# Patient Record
Sex: FEMALE | Race: WHITE | URBAN - METROPOLITAN AREA
[De-identification: names, ages, dates, MRNs, and addresses within clinical notes are randomized per-mention and may not be internally consistent; named-entity substitution may affect disease eponyms.]

---

## 2020-09-01 ENCOUNTER — OFFICE VISIT (OUTPATIENT)
Dept: FAMILY MEDICINE CLINIC | Age: 60
End: 2020-09-01
Payer: COMMERCIAL

## 2020-09-01 VITALS
HEART RATE: 84 BPM | OXYGEN SATURATION: 95 % | HEIGHT: 66 IN | BODY MASS INDEX: 22.18 KG/M2 | WEIGHT: 138 LBS | TEMPERATURE: 98.9 F

## 2020-09-01 PROCEDURE — 99202 OFFICE O/P NEW SF 15 MIN: CPT | Performed by: NURSE PRACTITIONER

## 2020-09-01 RX ORDER — ZOLPIDEM TARTRATE 5 MG/1
5 TABLET ORAL
COMMUNITY
End: 2022-06-13 | Stop reason: SDUPTHER

## 2020-09-01 RX ORDER — INSULIN LISPRO 100 [IU]/ML
INJECTION, SOLUTION INTRAVENOUS; SUBCUTANEOUS
COMMUNITY
End: 2022-11-02

## 2020-09-01 ASSESSMENT — PATIENT HEALTH QUESTIONNAIRE - PHQ9
SUM OF ALL RESPONSES TO PHQ9 QUESTIONS 1 & 2: 0
1. LITTLE INTEREST OR PLEASURE IN DOING THINGS: 0
SUM OF ALL RESPONSES TO PHQ QUESTIONS 1-9: 0
2. FEELING DOWN, DEPRESSED OR HOPELESS: 0
SUM OF ALL RESPONSES TO PHQ QUESTIONS 1-9: 0

## 2020-09-01 ASSESSMENT — ENCOUNTER SYMPTOMS
RHINORRHEA: 1
ABDOMINAL PAIN: 0
SINUS PRESSURE: 1
COUGH: 0
DIARRHEA: 0
VOMITING: 0
SORE THROAT: 0
SINUS PAIN: 1
SHORTNESS OF BREATH: 0
NAUSEA: 0

## 2020-09-01 NOTE — LETTER
[address] [place] [address]  [place] [address]  Phone: [phone]  Fax: [phone]    TYLER Ramachandran CNP        September 1, 2020     Patient: Tayla Merida   YOB: 1960   Date of Visit: 9/1/2020       To Whom It May Concern: It is my medical opinion that Tayla Merida is excused pending COVID results. If you have any questions or concerns, please don't hesitate to call.     Sincerely,        TYLER Ramachandran CNP

## 2020-09-01 NOTE — PATIENT INSTRUCTIONS
Advance Care Planning  People with COVID-19 may have no symptoms, mild symptoms, such as fever, cough, and shortness of breath or they may have more severe illness, developing severe and fatal pneumonia. As a result, Advance Care Planning with attention to naming a health care decision maker (someone you trust to make healthcare decisions for you if you could not speak for yourself) and sharing other health care preferences is important BEFORE a possible health crisis. Please contact your Primary Care Provider to discuss Advance Care Planning. Preventing the Spread of Coronavirus Disease 2019 in Homes and Residential Communities  For the most recent information go to [email or web address]    Prevention steps for People with confirmed or suspected COVID-19 (including persons under investigation) who do not need to be hospitalized  and   People with confirmed COVID-19 who were hospitalized and determined to be medically stable to go home    Your healthcare provider and public health staff will evaluate whether you can be cared for at home. If it is determined that you do not need to be hospitalized and can be isolated at home, you will be monitored by staff from your local or state health department. You should follow the prevention steps below until a healthcare provider or local or state health department says you can return to your normal activities. Stay home except to get medical care  People who are mildly ill with COVID-19 are able to isolate at home during their illness. You should restrict activities outside your home, except for getting medical care. Do not go to work, school, or public areas. Avoid using public transportation, ride-sharing, or taxis. Separate yourself from other people and animals in your home  People: As much as possible, you should stay in a specific room and away from other people in your home.  Also, you should use a separate before eating or preparing food. If soap and water are not readily available, use an alcohol-based hand  with at least 60% alcohol, covering all surfaces of your hands and rubbing them together until they feel dry. Soap and water are the best option if hands are visibly dirty. Avoid touching your eyes, nose, and mouth with unwashed hands. Avoid sharing personal household items  You should not share dishes, drinking glasses, cups, eating utensils, towels, or bedding with other people or pets in your home. After using these items, they should be washed thoroughly with soap and water. Clean all high-touch surfaces everyday  High touch surfaces include counters, tabletops, doorknobs, bathroom fixtures, toilets, phones, keyboards, tablets, and bedside tables. Also, clean any surfaces that may have blood, stool, or body fluids on them. Use a household cleaning spray or wipe, according to the label instructions. Labels contain instructions for safe and effective use of the cleaning product including precautions you should take when applying the product, such as wearing gloves and making sure you have good ventilation during use of the product. Monitor your symptoms  Seek prompt medical attention if your illness is worsening (e.g., difficulty breathing). Before seeking care, call your healthcare provider and tell them that you have, or are being evaluated for, COVID-19. Put on a facemask before you enter the facility. These steps will help the healthcare providers office to keep other people in the office or waiting room from getting infected or exposed. Ask your healthcare provider to call the local or state health department. Persons who are placed under active monitoring or facilitated self-monitoring should follow instructions provided by their local health department or occupational health professionals, as appropriate. When working with your local health department check their available hours.   If you have a medical emergency and need to call 911, notify the dispatch personnel that you have, or are being evaluated for COVID-19. If possible, put on a facemask before emergency medical services arrive. Discontinuing home isolation  Patients with confirmed COVID-19 should remain under home isolation precautions until the risk of secondary transmission to others is thought to be low. The decision to discontinue home isolation precautions should be made on a case-by-case basis, in consultation with healthcare providers and state and local health departments.

## 2020-09-02 ENCOUNTER — HOSPITAL ENCOUNTER (OUTPATIENT)
Age: 60
Setting detail: SPECIMEN
Discharge: HOME | End: 2020-09-02
Payer: COMMERCIAL

## 2020-09-02 NOTE — PROGRESS NOTES
throat. Loss of taste and smell   Respiratory: Negative for cough and shortness of breath. Cardiovascular: Negative for chest pain and palpitations. Gastrointestinal: Negative for abdominal pain, diarrhea, nausea and vomiting. Musculoskeletal: Positive for myalgias. Neurological: Positive for headaches. Negative for dizziness. All other systems reviewed and are negative. Objective:     Physical Exam  Vitals signs and nursing note reviewed. Constitutional:       General: She is not in acute distress. Appearance: Normal appearance. She is not toxic-appearing. HENT:      Nose: Congestion and rhinorrhea present. Right Sinus: Maxillary sinus tenderness and frontal sinus tenderness present. Left Sinus: Maxillary sinus tenderness and frontal sinus tenderness present. Mouth/Throat:      Mouth: Mucous membranes are moist.   Cardiovascular:      Rate and Rhythm: Normal rate. Pulmonary:      Effort: Pulmonary effort is normal. No respiratory distress. Breath sounds: Normal breath sounds. Neurological:      Mental Status: She is alert. Pulse 84   Temp 98.9 °F (37.2 °C) (Tympanic)   Ht 5' 6\" (1.676 m)   Wt 138 lb (62.6 kg)   SpO2 95%   BMI 22.27 kg/m²   Lab Review   No visits with results within 6 Month(s) from this visit. Latest known visit with results is:   No results found for any previous visit. Assessment:       Diagnosis Orders   1. Loss of taste  COVID-19 Ambulatory   2. Loss of smell  COVID-19 Ambulatory   3. Acute sinusitis, recurrence not specified, unspecified location  COVID-19 Ambulatory       Plan:      Return if symptoms worsen or fail to improve. No orders of the defined types were placed in this encounter. Continue sinusitis treatment. Recommend isolation pending covid results. Discussed treatment regimen to include rest, hydration, tylenol prn. Discussed deep breathing exercises.   Discussed to monitor for progression of symptoms. Follow up as needed. Will contact patient for results       Patient given educational materials - see patient instructions. Discussed use, benefit, and side effects of prescribed medications. All patientquestions answered. Pt voiced understanding. This note was transcribed using dictation with Dragon services. Efforts were made to correct any errors but some words may be misinterpreted.      Electronically signed by TYLER Lopez CNP on 9/1/2020at 9:46 PM

## 2020-09-03 LAB — SARS-COV-2, NAA: NOT DETECTED

## 2020-09-04 ENCOUNTER — TELEPHONE (OUTPATIENT)
Dept: PRIMARY CARE CLINIC | Age: 60
End: 2020-09-04

## 2021-08-31 ENCOUNTER — OFFICE VISIT (OUTPATIENT)
Dept: PRIMARY CARE CLINIC | Age: 61
End: 2021-08-31
Payer: COMMERCIAL

## 2021-08-31 VITALS
SYSTOLIC BLOOD PRESSURE: 114 MMHG | HEART RATE: 95 BPM | TEMPERATURE: 97.2 F | HEIGHT: 65 IN | OXYGEN SATURATION: 96 % | WEIGHT: 150 LBS | DIASTOLIC BLOOD PRESSURE: 76 MMHG | BODY MASS INDEX: 24.99 KG/M2

## 2021-08-31 DIAGNOSIS — H10.32 ACUTE CONJUNCTIVITIS OF LEFT EYE, UNSPECIFIED ACUTE CONJUNCTIVITIS TYPE: Primary | ICD-10-CM

## 2021-08-31 PROCEDURE — 99213 OFFICE O/P EST LOW 20 MIN: CPT | Performed by: NURSE PRACTITIONER

## 2021-08-31 PROCEDURE — G8420 CALC BMI NORM PARAMETERS: HCPCS | Performed by: NURSE PRACTITIONER

## 2021-08-31 PROCEDURE — 1036F TOBACCO NON-USER: CPT | Performed by: NURSE PRACTITIONER

## 2021-08-31 PROCEDURE — G8427 DOCREV CUR MEDS BY ELIG CLIN: HCPCS | Performed by: NURSE PRACTITIONER

## 2021-08-31 PROCEDURE — 3017F COLORECTAL CA SCREEN DOC REV: CPT | Performed by: NURSE PRACTITIONER

## 2021-08-31 RX ORDER — SULFACETAMIDE SODIUM 100 MG/ML
SOLUTION/ DROPS OPHTHALMIC
COMMUNITY
Start: 2021-08-19 | End: 2021-08-31 | Stop reason: ALTCHOICE

## 2021-08-31 RX ORDER — ATORVASTATIN CALCIUM 20 MG/1
TABLET, FILM COATED ORAL
COMMUNITY
Start: 2021-08-23 | End: 2022-07-14

## 2021-08-31 RX ORDER — POLYMYXIN B SULFATE AND TRIMETHOPRIM 1; 10000 MG/ML; [USP'U]/ML
1 SOLUTION OPHTHALMIC EVERY 4 HOURS
Qty: 10 ML | Refills: 0 | Status: SHIPPED | OUTPATIENT
Start: 2021-08-31 | End: 2021-09-10

## 2021-08-31 ASSESSMENT — ENCOUNTER SYMPTOMS
COUGH: 0
DOUBLE VISION: 0
EYE DISCHARGE: 1
PHOTOPHOBIA: 1
WHEEZING: 0
EYE PAIN: 1
BLURRED VISION: 0
CHEST TIGHTNESS: 0
EYE REDNESS: 1
RESPIRATORY NEGATIVE: 1
EYE ITCHING: 1
FOREIGN BODY SENSATION: 0
SHORTNESS OF BREATH: 0

## 2021-08-31 ASSESSMENT — PATIENT HEALTH QUESTIONNAIRE - PHQ9
1. LITTLE INTEREST OR PLEASURE IN DOING THINGS: 0
SUM OF ALL RESPONSES TO PHQ QUESTIONS 1-9: 0
SUM OF ALL RESPONSES TO PHQ9 QUESTIONS 1 & 2: 0
SUM OF ALL RESPONSES TO PHQ QUESTIONS 1-9: 0
2. FEELING DOWN, DEPRESSED OR HOPELESS: 0
SUM OF ALL RESPONSES TO PHQ QUESTIONS 1-9: 0

## 2021-08-31 NOTE — PROGRESS NOTES
[facility] [place] [facility]  [address] [address]  Dept: [phone]  Dept Fax: [phone]     Kristen Vaughn is a 63 y.o. female who presents to the urgent care today for her medicalconditions/complaints as noted below. Kristen Vaughn is c/o of Eye Drainage (eye redness, drainage and painful x 4 days; has been using eye drops prescribed by PCP for 2 days)    HPI:      Eye Pain   The left eye is affected. This is a new problem. Episode onset: 4 days ago. The problem has been unchanged. Injury mechanism: got shampoo in her left eye. The pain is mild. There is no known exposure to pink eye. She does not wear contacts. Associated symptoms include an eye discharge (watery), eye redness, itching (mild), photophobia and a recent URI (PND). Pertinent negatives include no blurred vision, double vision, fever or foreign body sensation. Treatments tried: bleph 10. The treatment provided no relief. Past Medical History:   Diagnosis Date    Type 2 diabetes mellitus without complication (HCC)       Current Outpatient Medications   Medication Sig Dispense Refill    atorvastatin (LIPITOR) 20 MG tablet       metFORMIN (GLUCOPHAGE) 500 MG tablet       Insulin Degludec (TRESIBA FLEXTOUCH SC) Inject into the skin      trimethoprim-polymyxin b (POLYTRIM) 13173-8.1 UNIT/ML-% ophthalmic solution Place 1 drop into the left eye every 4 hours for 10 days 10 mL 0    zolpidem (AMBIEN) 5 MG tablet Take 5 mg by mouth.  canagliflozin (INVOKANA) 300 MG TABS tablet Take 300 mg by mouth      GLIMEPIRIDE PO Take by mouth 2 times daily      SITagliptin (JANUVIA) 100 MG tablet Take 100 mg by mouth 2 times daily (Patient not taking: Reported on 8/31/2021)      insulin lispro (HUMALOG) 100 UNIT/ML injection cartridge Inject into the skin (Patient not taking: Reported on 8/31/2021)       No current facility-administered medications for this visit.      Allergies   Allergen Reactions    Fenofibrate Micronized     Nitrofurantoin     Amoxicillin Rash    Sulfa Antibiotics Rash     Reviewed PMH, SH, and FH with the patient and updated. Subjective:      Review of Systems   Constitutional: Negative for chills, fatigue and fever. HENT: Positive for postnasal drip. Eyes: Positive for photophobia, pain, discharge (watery), redness and itching (mild). Negative for blurred vision, double vision and visual disturbance. Respiratory: Negative. Negative for cough, chest tightness, shortness of breath and wheezing. Cardiovascular: Negative. Negative for chest pain. Skin: Negative for rash. All other systems reviewed and are negative. Objective:      Physical Exam  Vitals and nursing note reviewed. Constitutional:       General: She is not in acute distress. Appearance: She is well-developed. She is not diaphoretic. HENT:      Head: Normocephalic and atraumatic. Eyes:      General: Lids are normal. Lids are everted, no foreign bodies appreciated. Right eye: No discharge. Left eye: No foreign body, discharge or hordeolum. Extraocular Movements: Extraocular movements intact. Left eye: Normal extraocular motion. Conjunctiva/sclera:      Left eye: Left conjunctiva is injected. Pupils: Pupils are equal, round, and reactive to light. Left eye: No corneal abrasion. Cardiovascular:      Rate and Rhythm: Normal rate and regular rhythm. Heart sounds: Normal heart sounds. No murmur heard. Pulmonary:      Effort: Pulmonary effort is normal. No respiratory distress. Breath sounds: Normal breath sounds. No wheezing. Skin:     General: Skin is warm and dry. Neurological:      Mental Status: She is alert.        /76 (Site: Left Upper Arm, Position: Sitting, Cuff Size: Medium Adult)   Pulse 95   Temp 97.2 °F (36.2 °C) (Infrared)   Ht 5' 5\" (1.651 m)   Wt 150 lb (68 kg)   SpO2 96%   BMI 24.96 kg/m²     Assessment: Diagnosis Orders   1. Acute conjunctivitis of left eye, unspecified acute conjunctivitis type  trimethoprim-polymyxin b (POLYTRIM) 86855-7.1 UNIT/ML-% ophthalmic solution     Plan:      Based on the clinical exam findings-- I will treat this as conjunctivitis at this time with antibiotic eye gtts. Cool compresses to the eyes if desired. Good hand hygiene encouraged. Patient agreeable to treatment plan. Educational materials provided on AVS.  Follow up eye doctor if symptoms do not improve/worsen. Orders Placed This Encounter   Medications    trimethoprim-polymyxin b (POLYTRIM) 17162-5.1 UNIT/ML-% ophthalmic solution     Sig: Place 1 drop into the left eye every 4 hours for 10 days     Dispense:  10 mL     Refill:  0        Patient given educational materials - see patient instructions. Discussed use, benefit, and side effects of prescribed medications. All patientquestions answered. Pt voiced understanding.     Electronically signed by TYLER Adames CNP on 8/31/2021at 7:34 PM

## 2021-12-17 ENCOUNTER — OFFICE VISIT (OUTPATIENT)
Dept: PRIMARY CARE CLINIC | Age: 61
End: 2021-12-17
Payer: COMMERCIAL

## 2021-12-17 ENCOUNTER — HOSPITAL ENCOUNTER (OUTPATIENT)
Age: 61
Setting detail: SPECIMEN
Discharge: HOME | End: 2021-12-17

## 2021-12-17 VITALS
TEMPERATURE: 97 F | WEIGHT: 140 LBS | OXYGEN SATURATION: 99 % | HEART RATE: 96 BPM | SYSTOLIC BLOOD PRESSURE: 121 MMHG | DIASTOLIC BLOOD PRESSURE: 72 MMHG | HEIGHT: 66 IN | BODY MASS INDEX: 22.5 KG/M2

## 2021-12-17 DIAGNOSIS — R39.9 UTI SYMPTOMS: Primary | ICD-10-CM

## 2021-12-17 DIAGNOSIS — R39.9 UTI SYMPTOMS: ICD-10-CM

## 2021-12-17 DIAGNOSIS — N30.01 ACUTE CYSTITIS WITH HEMATURIA: ICD-10-CM

## 2021-12-17 LAB
BILIRUBIN, POC: ABNORMAL
BLOOD URINE, POC: ABNORMAL
CLARITY, POC: CLEAR
COLOR, POC: ABNORMAL
GLUCOSE URINE, POC: ABNORMAL
KETONES, POC: ABNORMAL
LEUKOCYTE EST, POC: ABNORMAL
NITRITE, POC: POSITIVE
PH, POC: 5.5
PROTEIN, POC: ABNORMAL
SPECIFIC GRAVITY, POC: 1.01
UROBILINOGEN, POC: 0.2

## 2021-12-17 PROCEDURE — 99214 OFFICE O/P EST MOD 30 MIN: CPT | Performed by: FAMILY MEDICINE

## 2021-12-17 PROCEDURE — G8484 FLU IMMUNIZE NO ADMIN: HCPCS | Performed by: FAMILY MEDICINE

## 2021-12-17 PROCEDURE — G8420 CALC BMI NORM PARAMETERS: HCPCS | Performed by: FAMILY MEDICINE

## 2021-12-17 PROCEDURE — 81003 URINALYSIS AUTO W/O SCOPE: CPT | Performed by: FAMILY MEDICINE

## 2021-12-17 PROCEDURE — 1036F TOBACCO NON-USER: CPT | Performed by: FAMILY MEDICINE

## 2021-12-17 PROCEDURE — G8427 DOCREV CUR MEDS BY ELIG CLIN: HCPCS | Performed by: FAMILY MEDICINE

## 2021-12-17 PROCEDURE — 3017F COLORECTAL CA SCREEN DOC REV: CPT | Performed by: FAMILY MEDICINE

## 2021-12-17 RX ORDER — LEVOFLOXACIN 250 MG/1
250 TABLET ORAL DAILY
Qty: 5 TABLET | Refills: 0 | Status: SHIPPED | OUTPATIENT
Start: 2021-12-17 | End: 2021-12-22

## 2021-12-17 SDOH — ECONOMIC STABILITY: FOOD INSECURITY: WITHIN THE PAST 12 MONTHS, THE FOOD YOU BOUGHT JUST DIDN'T LAST AND YOU DIDN'T HAVE MONEY TO GET MORE.: PATIENT DECLINED

## 2021-12-17 SDOH — ECONOMIC STABILITY: FOOD INSECURITY: WITHIN THE PAST 12 MONTHS, YOU WORRIED THAT YOUR FOOD WOULD RUN OUT BEFORE YOU GOT MONEY TO BUY MORE.: PATIENT DECLINED

## 2021-12-17 ASSESSMENT — ENCOUNTER SYMPTOMS
NAUSEA: 1
BACK PAIN: 1
VOMITING: 0
DIARRHEA: 0
RESPIRATORY NEGATIVE: 1
ABDOMINAL PAIN: 0

## 2021-12-17 ASSESSMENT — SOCIAL DETERMINANTS OF HEALTH (SDOH): HOW HARD IS IT FOR YOU TO PAY FOR THE VERY BASICS LIKE FOOD, HOUSING, MEDICAL CARE, AND HEATING?: PATIENT DECLINED

## 2021-12-17 NOTE — PROGRESS NOTES
[facility] [place] [facility]  [address] [address]  Dept: [phone]  Dept Fax: [phone]    Chhaya Cruz is a 63 y.o. female who presents today for her medical conditions/complaintsas noted below. Chhaya Cruz is c/o of Urinary Frequency (Been going on for a month ), Lower Back Pain, and Fatigue        HPI:     Urinary Frequency   This is a new problem. The current episode started more than 1 month ago. The problem occurs every urination. The problem has been unchanged. The pain is moderate. There has been no fever. She is sexually active (No concern for STD). There is no history of pyelonephritis. Associated symptoms include frequency and nausea. Pertinent negatives include no chills, discharge, flank pain, hematuria, possible pregnancy or vomiting. Associated symptoms comments: Fatigue, lower back pain. Treatments tried: monostat. The treatment provided no relief. Her past medical history is significant for recurrent UTIs. There is no history of kidney stones. Patient reports that she typically takes Levaquin for UTI and this works well for her. Past Medical History:   Diagnosis Date    Type 2 diabetes mellitus without complication ([facility] [address]     Past medical history reviewed and pertinent positives/negatives in the HPI    Past Surgical History:   Procedure Laterality Date    COLOSTOMY         History reviewed. No pertinent family history. Social History     Tobacco Use    Smoking status: Never Smoker    Smokeless tobacco: Never Used   Substance Use Topics    Alcohol use: Not on file      Current Outpatient Medications   Medication Sig Dispense Refill    levoFLOXacin (LEVAQUIN) 250 MG tablet Take 1 tablet by mouth daily for 5 days 5 tablet 0    atorvastatin (LIPITOR) 20 MG tablet       metFORMIN (GLUCOPHAGE) 500 MG tablet       Insulin Degludec (TRESIBA FLEXTOUCH SC) Inject into the skin      zolpidem (AMBIEN) 5 MG tablet Take 5 mg by mouth.       SITagliptin (JANUVIA) 100 MG tablet Take 100 mg by mouth 2 times daily       insulin lispro (HUMALOG) 100 UNIT/ML injection cartridge Inject into the skin       canagliflozin (INVOKANA) 300 MG TABS tablet Take 300 mg by mouth      GLIMEPIRIDE PO Take by mouth 2 times daily       No current facility-administered medications for this visit. Allergies   Allergen Reactions    Fenofibrate Micronized     Nitrofurantoin     Amoxicillin Rash    Sulfa Antibiotics Rash       Health Maintenance   Topic Date Due    Hepatitis C screen  Never done    Lipid screen  Never done    COVID-19 Vaccine (1) Never done    HIV screen  Never done    Cervical cancer screen  Never done    Colon cancer screen colonoscopy  Never done    Breast cancer screen  Never done    Flu vaccine (1) 09/01/2021    DTaP/Tdap/Td vaccine (2 - Td or Tdap) 01/09/2030    Shingles Vaccine  Completed    Hepatitis A vaccine  Aged Out    Hepatitis B vaccine  Aged Out    Hib vaccine  Aged Out    Meningococcal (ACWY) vaccine  Aged Out    Pneumococcal 0-64 years Vaccine  Aged Out       :      Review of Systems   Constitutional: Positive for fatigue. Negative for chills and fever. HENT: Negative. Respiratory: Negative. Gastrointestinal: Positive for nausea. Negative for abdominal pain, diarrhea and vomiting. Genitourinary: Positive for frequency. Negative for dysuria, flank pain, hematuria, pelvic pain, vaginal bleeding, vaginal discharge and vaginal pain. Musculoskeletal: Positive for back pain. Skin: Negative for pallor and rash. Hematological: Negative for adenopathy. Objective:     Physical Exam  Vitals and nursing note reviewed. Constitutional:       Appearance: Normal appearance. HENT:      Head: Normocephalic and atraumatic. Right Ear: Hearing normal.      Left Ear: Hearing normal.      Mouth/Throat:      Lips: Pink. Eyes:      Extraocular Movements: Extraocular movements intact.       Conjunctiva/sclera: Conjunctivae normal.   Cardiovascular:      Rate and Rhythm: Normal rate. Pulmonary:      Effort: Pulmonary effort is normal.   Musculoskeletal:      Cervical back: No muscular tenderness. Skin:     General: Skin is warm and dry. Neurological:      Mental Status: She is alert and oriented to person, place, and time. Mental status is at baseline. Psychiatric:         Mood and Affect: Mood normal.         Behavior: Behavior normal.         Thought Content: Thought content normal.         Judgment: Judgment normal.       /72   Pulse 96   Temp 97 °F (36.1 °C) (Temporal)   Ht 5' 6\" (1.676 m)   Wt 140 lb (63.5 kg)   SpO2 99%   BMI 22.60 kg/m²     Assessment:       Diagnosis Orders   1. UTI symptoms  POCT Urinalysis No Micro (Auto)    Culture, Urine   2. Acute cystitis with hematuria         Plan:    Urine in office positive for UTI. Take antibiotic as prescribed. Will send urine for culture and call if antibiotic changes needed. If symptoms worsen or do not improve please follow-up with PCP or return to clinic    Orders Placed This Encounter   Procedures    Culture, Urine     Standing Status:   Future     Standing Expiration Date:   12/17/2022     Order Specific Question:   Specify (ex-cath, midstream, cysto, etc)? Answer:   midstream    POCT Urinalysis No Micro (Auto)     Orders Placed This Encounter   Medications    levoFLOXacin (LEVAQUIN) 250 MG tablet     Sig: Take 1 tablet by mouth daily for 5 days     Dispense:  5 tablet     Refill:  0      Patient given educational materials - see patient instructions. Discussed use, benefit, and side effects of prescribed medications. All patient questions answered. Pt voiced understanding. Follow up as directed.      Electronicallysigned by Shawnee Patel MD on 12/17/2021 at 6:43 PM

## 2021-12-17 NOTE — PATIENT INSTRUCTIONS
Patient Education        Urinary Tract Infection (UTI) in Women: Care Instructions  Overview     A urinary tract infection, or UTI, is a general term for an infection anywhere between the kidneys and the urethra (where urine comes out). Most UTIs are bladder infections. They often cause pain or burning when you urinate. UTIs are caused by bacteria and can be cured with antibiotics. Be sure to complete your treatment so that the infection does not get worse. Follow-up care is a key part of your treatment and safety. Be sure to make and go to all appointments, and call your doctor if you are having problems. It's also a good idea to know your test results and keep a list of the medicines you take. How can you care for yourself at home? · Take your antibiotics as directed. Do not stop taking them just because you feel better. You need to take the full course of antibiotics. · Drink extra water and other fluids for the next day or two. This will help make the urine less concentrated and help wash out the bacteria that are causing the infection. (If you have kidney, heart, or liver disease and have to limit fluids, talk with your doctor before you increase the amount of fluids you drink.)  · Avoid drinks that are carbonated or have caffeine. They can irritate the bladder. · Urinate often. Try to empty your bladder each time. · To relieve pain, take a hot bath or lay a heating pad set on low over your lower belly or genital area. Never go to sleep with a heating pad in place. To prevent UTIs  · Drink plenty of water each day. This helps you urinate often, which clears bacteria from your system. (If you have kidney, heart, or liver disease and have to limit fluids, talk with your doctor before you increase the amount of fluids you drink.)  · Urinate when you need to. · If you are sexually active, urinate right after you have sex. · Change sanitary pads often.   · Avoid douches, bubble baths, feminine hygiene sprays, and other feminine hygiene products that have deodorants. · After going to the bathroom, wipe from front to back. When should you call for help? Call your doctor now or seek immediate medical care if:    · Symptoms such as fever, chills, nausea, or vomiting get worse or appear for the first time.     · You have new pain in your back just below your rib cage. This is called flank pain.     · There is new blood or pus in your urine.     · You have any problems with your antibiotic medicine. Watch closely for changes in your health, and be sure to contact your doctor if:    · You are not getting better after taking an antibiotic for 2 days.     · Your symptoms go away but then come back. Where can you learn more? Go to https://[employer] org and sign in to your [employer] account. Enter E981 in the [employer] box to learn more about \"Urinary Tract Infection (UTI) in Women: Care Instructions. \"     If you do not have an account, please click on the \"Sign Up Now\" link. Current as of: February 10, 2021               Content Version: 13.0  © 4806-7869 [employer]. Care instructions adapted under license by [facility] If you have questions about a medical condition or this instruction, always ask your healthcare professional. [address] any warranty or liability for your use of this information. Urine in office positive for UTI. Take antibiotic as prescribed. Will send urine for culture and call if antibiotic changes needed.   If symptoms worsen or do not improve please follow-up with PCP or return to clinic

## 2021-12-19 LAB
CULTURE: ABNORMAL
Lab: ABNORMAL
SPECIMEN DESCRIPTION: ABNORMAL

## 2021-12-23 ENCOUNTER — OFFICE VISIT (OUTPATIENT)
Dept: PRIMARY CARE CLINIC | Age: 61
End: 2021-12-23
Payer: COMMERCIAL

## 2021-12-23 VITALS
DIASTOLIC BLOOD PRESSURE: 77 MMHG | TEMPERATURE: 97.7 F | OXYGEN SATURATION: 98 % | SYSTOLIC BLOOD PRESSURE: 119 MMHG | HEART RATE: 93 BPM

## 2021-12-23 DIAGNOSIS — N30.01 ACUTE CYSTITIS WITH HEMATURIA: Primary | ICD-10-CM

## 2021-12-23 DIAGNOSIS — R39.9 UTI SYMPTOMS: ICD-10-CM

## 2021-12-23 LAB
BILIRUBIN, POC: ABNORMAL
BLOOD URINE, POC: ABNORMAL
CLARITY, POC: ABNORMAL
COLOR, POC: YELLOW
GLUCOSE URINE, POC: 500
KETONES, POC: ABNORMAL
LEUKOCYTE EST, POC: ABNORMAL
NITRITE, POC: ABNORMAL
PH, POC: 5.5
PROTEIN, POC: ABNORMAL
SPECIFIC GRAVITY, POC: 1.01
UROBILINOGEN, POC: 0.2

## 2021-12-23 PROCEDURE — G8484 FLU IMMUNIZE NO ADMIN: HCPCS | Performed by: NURSE PRACTITIONER

## 2021-12-23 PROCEDURE — G8427 DOCREV CUR MEDS BY ELIG CLIN: HCPCS | Performed by: NURSE PRACTITIONER

## 2021-12-23 PROCEDURE — G8420 CALC BMI NORM PARAMETERS: HCPCS | Performed by: NURSE PRACTITIONER

## 2021-12-23 PROCEDURE — 1036F TOBACCO NON-USER: CPT | Performed by: NURSE PRACTITIONER

## 2021-12-23 PROCEDURE — 3017F COLORECTAL CA SCREEN DOC REV: CPT | Performed by: NURSE PRACTITIONER

## 2021-12-23 PROCEDURE — 99213 OFFICE O/P EST LOW 20 MIN: CPT | Performed by: NURSE PRACTITIONER

## 2021-12-23 PROCEDURE — 81003 URINALYSIS AUTO W/O SCOPE: CPT | Performed by: NURSE PRACTITIONER

## 2021-12-23 RX ORDER — INSULIN GLARGINE 100 [IU]/ML
40 INJECTION, SOLUTION SUBCUTANEOUS 2 TIMES DAILY
COMMUNITY

## 2021-12-23 RX ORDER — FLASH GLUCOSE SCANNING READER
EACH MISCELLANEOUS
COMMUNITY

## 2021-12-23 RX ORDER — NITROFURANTOIN 25; 75 MG/1; MG/1
100 CAPSULE ORAL 2 TIMES DAILY
Qty: 10 CAPSULE | Refills: 0 | Status: SHIPPED | OUTPATIENT
Start: 2021-12-23 | End: 2021-12-28

## 2021-12-23 RX ORDER — FLASH GLUCOSE SENSOR
KIT MISCELLANEOUS
COMMUNITY
End: 2022-07-14 | Stop reason: SDUPTHER

## 2021-12-23 RX ORDER — VENLAFAXINE HYDROCHLORIDE 75 MG/1
CAPSULE, EXTENDED RELEASE ORAL
COMMUNITY

## 2021-12-23 RX ORDER — PEN NEEDLE, DIABETIC 30 GX5/16"
NEEDLE, DISPOSABLE MISCELLANEOUS
COMMUNITY

## 2021-12-23 RX ORDER — OMEGA-3 FATTY ACIDS/FISH OIL 300-1000MG
CAPSULE ORAL
COMMUNITY
End: 2022-07-14

## 2021-12-23 ASSESSMENT — ENCOUNTER SYMPTOMS
ABDOMINAL PAIN: 0
CHEST TIGHTNESS: 0
COUGH: 0
CONSTIPATION: 0
NAUSEA: 0
RESPIRATORY NEGATIVE: 1
VOMITING: 0
SHORTNESS OF BREATH: 0
WHEEZING: 0
DIARRHEA: 0

## 2021-12-23 NOTE — PROGRESS NOTES
metFORMIN (GLUCOPHAGE) 500 MG tablet       zolpidem (AMBIEN) 5 MG tablet Take 5 mg by mouth.  insulin lispro (HUMALOG) 100 UNIT/ML injection cartridge Inject into the skin       atorvastatin (LIPITOR) 20 MG tablet  (Patient not taking: Reported on 12/23/2021)      Insulin Degludec (TRESIBA FLEXTOUCH SC) Inject into the skin (Patient not taking: Reported on 12/23/2021)      SITagliptin (JANUVIA) 100 MG tablet Take 100 mg by mouth 2 times daily  (Patient not taking: Reported on 12/23/2021)      canagliflozin (INVOKANA) 300 MG TABS tablet Take 300 mg by mouth (Patient not taking: Reported on 12/23/2021)      GLIMEPIRIDE PO Take by mouth 2 times daily (Patient not taking: Reported on 12/23/2021)       No current facility-administered medications for this visit. Allergies   Allergen Reactions    Dulaglutide      Other reaction(s): severe abdominal pain    Fenofibrate      Other reaction(s): rash    Fenofibrate Micronized     Liraglutide      Other reaction(s): stomach upset    Nitrofurantoin     Amoxicillin Rash    Sulfa Antibiotics Rash     Reviewed PMH, SH, and FH with the patient and updated. Subjective:      Review of Systems   Constitutional: Negative for chills, fatigue and fever. Respiratory: Negative. Negative for cough, chest tightness, shortness of breath and wheezing. Cardiovascular: Negative. Negative for chest pain. Gastrointestinal: Negative for abdominal pain, constipation, diarrhea, nausea and vomiting. Genitourinary: Positive for dysuria, flank pain, frequency and urgency. Negative for difficulty urinating, hematuria, pelvic pain and vaginal discharge. Skin: Negative for rash. All other systems reviewed and are negative. Objective:      Physical Exam  Vitals and nursing note reviewed. Constitutional:       General: She is not in acute distress. Appearance: She is well-developed. She is not diaphoretic. HENT:      Head: Normocephalic and atraumatic. Cardiovascular:      Rate and Rhythm: Normal rate and regular rhythm. Heart sounds: Normal heart sounds. No murmur heard. Pulmonary:      Effort: Pulmonary effort is normal. No respiratory distress. Breath sounds: Normal breath sounds. No wheezing. Abdominal:      General: Abdomen is flat. Bowel sounds are normal. There is no distension. Palpations: Abdomen is soft. Tenderness: There is no abdominal tenderness. There is no right CVA tenderness or left CVA tenderness. Skin:     General: Skin is warm and dry. Neurological:      Mental Status: She is alert. /77 (Site: Left Upper Arm, Position: Sitting, Cuff Size: Medium Adult)   Pulse 93   Temp 97.7 °F (36.5 °C) (Tympanic)   SpO2 98%     Results for orders placed or performed in visit on 12/23/21   POCT Urinalysis No Micro (Auto)   Result Value Ref Range    Color, UA YELLOW     Clarity, UA CLOUDY     Glucose, UA      Bilirubin, UA NEG     Ketones, UA NEG     Spec Grav, UA 1.015     Blood, UA POC NEG     pH, UA 5.5     Protein, UA POC NEG     Urobilinogen, UA 0.2     Leukocytes, UA SMALL     Nitrite, UA NEG      Assessment:       Diagnosis Orders   1. Acute cystitis with hematuria  nitrofurantoin, macrocrystal-monohydrate, (MACROBID) 100 MG capsule   2. UTI symptoms  POCT Urinalysis No Micro (Auto)     Plan:      Patient instructed to complete entire antibiotic course. Increase fluid intake. Educational materials regarding UTI given on AVS.  Patient agreeable to treatment plan. Follow up if symptoms do not improve/worsen. Orders Placed This Encounter   Medications    nitrofurantoin, macrocrystal-monohydrate, (MACROBID) 100 MG capsule     Sig: Take 1 capsule by mouth 2 times daily for 5 days     Dispense:  10 capsule     Refill:  0        Patient given educational materials - see patient instructions. Discussed use, benefit, and side effects of prescribed medications. All patientquestions answered.   Pt voiced understanding.     Electronically signed by TYLER Bryant CNP on 12/23/2021at 6:55 PM

## 2021-12-23 NOTE — PATIENT INSTRUCTIONS
Patient Education        Urinary Tract Infection (UTI) in Women: Care Instructions  Overview     A urinary tract infection, or UTI, is a general term for an infection anywhere between the kidneys and the urethra (where urine comes out). Most UTIs are bladder infections. They often cause pain or burning when you urinate. UTIs are caused by bacteria and can be cured with antibiotics. Be sure to complete your treatment so that the infection does not get worse. Follow-up care is a key part of your treatment and safety. Be sure to make and go to all appointments, and call your doctor if you are having problems. It's also a good idea to know your test results and keep a list of the medicines you take. How can you care for yourself at home? · Take your antibiotics as directed. Do not stop taking them just because you feel better. You need to take the full course of antibiotics. · Drink extra water and other fluids for the next day or two. This will help make the urine less concentrated and help wash out the bacteria that are causing the infection. (If you have kidney, heart, or liver disease and have to limit fluids, talk with your doctor before you increase the amount of fluids you drink.)  · Avoid drinks that are carbonated or have caffeine. They can irritate the bladder. · Urinate often. Try to empty your bladder each time. · To relieve pain, take a hot bath or lay a heating pad set on low over your lower belly or genital area. Never go to sleep with a heating pad in place. To prevent UTIs  · Drink plenty of water each day. This helps you urinate often, which clears bacteria from your system. (If you have kidney, heart, or liver disease and have to limit fluids, talk with your doctor before you increase the amount of fluids you drink.)  · Urinate when you need to. · If you are sexually active, urinate right after you have sex. · Change sanitary pads often.   · Avoid douches, bubble baths, feminine hygiene sprays, and other feminine hygiene products that have deodorants. · After going to the bathroom, wipe from front to back. When should you call for help? Call your doctor now or seek immediate medical care if:    · Symptoms such as fever, chills, nausea, or vomiting get worse or appear for the first time.     · You have new pain in your back just below your rib cage. This is called flank pain.     · There is new blood or pus in your urine.     · You have any problems with your antibiotic medicine. Watch closely for changes in your health, and be sure to contact your doctor if:    · You are not getting better after taking an antibiotic for 2 days.     · Your symptoms go away but then come back. Where can you learn more? Go to https://Language [email or web address].Tidy [email or web address] org and sign in to your Skip Hop account. Enter X167 in the HOSTING box to learn more about \"Urinary Tract Infection (UTI) in Women: Care Instructions. \"     If you do not have an account, please click on the \"Sign Up Now\" link. Current as of: February 10, 2021               Content Version: 13.1  © 5233-4405 Healthwise, Incorporated. Care instructions adapted under license by [facility] ([facility]). If you have questions about a medical condition or this instruction, always ask your healthcare professional. [address] any warranty or liability for your use of this information.

## 2022-01-14 ENCOUNTER — OFFICE VISIT (OUTPATIENT)
Dept: PRIMARY CARE CLINIC | Age: 62
End: 2022-01-14
Payer: COMMERCIAL

## 2022-01-14 ENCOUNTER — HOSPITAL ENCOUNTER (OUTPATIENT)
Age: 62
Setting detail: SPECIMEN
Discharge: HOME | End: 2022-01-14

## 2022-01-14 VITALS
HEART RATE: 98 BPM | OXYGEN SATURATION: 98 % | SYSTOLIC BLOOD PRESSURE: 129 MMHG | TEMPERATURE: 97.2 F | DIASTOLIC BLOOD PRESSURE: 81 MMHG

## 2022-01-14 DIAGNOSIS — R39.9 UTI SYMPTOMS: ICD-10-CM

## 2022-01-14 DIAGNOSIS — N30.01 ACUTE CYSTITIS WITH HEMATURIA: Primary | ICD-10-CM

## 2022-01-14 LAB
BILIRUBIN, POC: ABNORMAL
BLOOD URINE, POC: ABNORMAL
CLARITY, POC: ABNORMAL
COLOR, POC: YELLOW
GLUCOSE URINE, POC: ABNORMAL
KETONES, POC: ABNORMAL
LEUKOCYTE EST, POC: ABNORMAL
NITRITE, POC: POSITIVE
PH, POC: 5.5
PROTEIN, POC: ABNORMAL
SPECIFIC GRAVITY, POC: 1.02
UROBILINOGEN, POC: 0.2

## 2022-01-14 PROCEDURE — 99213 OFFICE O/P EST LOW 20 MIN: CPT | Performed by: NURSE PRACTITIONER

## 2022-01-14 PROCEDURE — 1036F TOBACCO NON-USER: CPT | Performed by: NURSE PRACTITIONER

## 2022-01-14 PROCEDURE — G8484 FLU IMMUNIZE NO ADMIN: HCPCS | Performed by: NURSE PRACTITIONER

## 2022-01-14 PROCEDURE — G8427 DOCREV CUR MEDS BY ELIG CLIN: HCPCS | Performed by: NURSE PRACTITIONER

## 2022-01-14 PROCEDURE — 81003 URINALYSIS AUTO W/O SCOPE: CPT | Performed by: NURSE PRACTITIONER

## 2022-01-14 PROCEDURE — 3017F COLORECTAL CA SCREEN DOC REV: CPT | Performed by: NURSE PRACTITIONER

## 2022-01-14 PROCEDURE — G8420 CALC BMI NORM PARAMETERS: HCPCS | Performed by: NURSE PRACTITIONER

## 2022-01-14 RX ORDER — CEPHALEXIN 500 MG/1
500 CAPSULE ORAL 2 TIMES DAILY
Qty: 20 CAPSULE | Refills: 0 | Status: SHIPPED | OUTPATIENT
Start: 2022-01-14 | End: 2022-01-24

## 2022-01-14 ASSESSMENT — ENCOUNTER SYMPTOMS
COUGH: 0
CHEST TIGHTNESS: 0
CONSTIPATION: 0
WHEEZING: 0
ABDOMINAL PAIN: 0
DIARRHEA: 0
SHORTNESS OF BREATH: 0
NAUSEA: 0
RESPIRATORY NEGATIVE: 1
VOMITING: 0

## 2022-01-14 NOTE — PROGRESS NOTES
[address] [facility]  [address] [place] [address]  [place] [address]  Dept: [phone]  Dept Fax: [phone]     Norberto Gaines is a 63 y.o. female who presents to the urgent care today for her medicalconditions/complaints as noted below. Norberto Gaines is c/o of Urinary Tract Infection (fatigue- feels the uti did not resolve from being seen 12/23/21)    HPI:      Urinary Tract Infection   This is a new problem. The current episode started 1 to 4 weeks ago. The problem has been unchanged. The patient is experiencing no pain. There has been no fever. Pertinent negatives include no chills, flank pain, frequency, hematuria, nausea, urgency or vomiting. Associated symptoms comments: C/O pelvis pressure and cloudy urine. Treatments tried: levaquin, macrobid. The treatment provided moderate relief. Her past medical history is significant for recurrent UTIs. Want to make sure her UTI has resolved prior to starting Humira.     Past Medical History:   Diagnosis Date    Type 2 diabetes mellitus without complication (HCC)       Current Outpatient Medications   Medication Sig Dispense Refill    cephALEXin (KEFLEX) 500 MG capsule Take 1 capsule by mouth 2 times daily for 10 days 20 capsule 0    insulin glargine (LANTUS SOLOSTAR) 100 UNIT/ML injection pen Lantus Solostar U-100 Insulin 100 unit/mL (3 mL) subcutaneous pen   ADMINISTER 25 UNITS UNDER THE SKIN TWICE DAILY AS DIRECTED      venlafaxine (EFFEXOR XR) 75 MG extended release capsule venlafaxine ER 75 mg capsule,extended release 24 hr      ibuprofen (ADVIL;MOTRIN) 200 MG CAPS 1-2 tablets as needed      Turmeric (QC TUMERIC COMPLEX PO)       Cholecalciferol (VITAMIN D3 PO) 1 capsule      Continuous Blood Gluc  (FREESTYLE RYANNE 14 DAY READER) BEATA FreeStyle Ryanne 14 Day [place]   USE AS DIRECTED 4-6 TIMES DAILY      Continuous Blood Gluc Sensor (FREESTYLE RYANNE 14 DAY SENSOR) [facility] Nae Guerrier 14 Day Sensor kit   USE AS DIRECTED 4 TO 6 TIMES DAILY      Insulin Pen Needle (PEN NEEDLES 3/16\") 31G X 5 MM MISC TO USE 2 X DAILY      metFORMIN (GLUCOPHAGE) 500 MG tablet       zolpidem (AMBIEN) 5 MG tablet Take 5 mg by mouth.  insulin lispro (HUMALOG) 100 UNIT/ML injection cartridge Inject into the skin       atorvastatin (LIPITOR) 20 MG tablet  (Patient not taking: Reported on 12/23/2021)      Insulin Degludec (TRESIBA FLEXTOUCH SC) Inject into the skin (Patient not taking: Reported on 12/23/2021)      SITagliptin (JANUVIA) 100 MG tablet Take 100 mg by mouth 2 times daily  (Patient not taking: Reported on 12/23/2021)      canagliflozin (INVOKANA) 300 MG TABS tablet Take 300 mg by mouth (Patient not taking: Reported on 12/23/2021)      GLIMEPIRIDE PO Take by mouth 2 times daily (Patient not taking: Reported on 12/23/2021)       No current facility-administered medications for this visit. Allergies   Allergen Reactions    Dulaglutide      Other reaction(s): severe abdominal pain    Fenofibrate      Other reaction(s): rash    Fenofibrate Micronized     Liraglutide      Other reaction(s): stomach upset    Nitrofurantoin     Amoxicillin Rash    Sulfa Antibiotics Rash     Reviewed PMH, SH, and FH with the patient and updated. Subjective:      Review of Systems   Constitutional: Positive for fatigue. Negative for chills and fever. Respiratory: Negative. Negative for cough, chest tightness, shortness of breath and wheezing. Cardiovascular: Negative. Negative for chest pain. Gastrointestinal: Negative for abdominal pain, constipation, diarrhea, nausea and vomiting. Genitourinary: Positive for pelvic pain (pressure). Negative for decreased urine volume, difficulty urinating, dysuria, flank pain, frequency, hematuria and urgency. C/o cloudy urine   Skin: Negative for rash. All other systems reviewed and are negative.     Objective:      Physical Exam  Vitals and nursing note reviewed. Constitutional:       General: She is not in acute distress. Appearance: She is well-developed. She is not diaphoretic. HENT:      Head: Normocephalic and atraumatic. Cardiovascular:      Rate and Rhythm: Normal rate and regular rhythm. Heart sounds: Normal heart sounds. No murmur heard. Pulmonary:      Effort: Pulmonary effort is normal. No respiratory distress. Breath sounds: Normal breath sounds. No wheezing. Abdominal:      General: Abdomen is flat. Bowel sounds are normal. There is no distension. Palpations: Abdomen is soft. Tenderness: There is no abdominal tenderness. There is no right CVA tenderness or left CVA tenderness. Skin:     General: Skin is warm and dry. Neurological:      Mental Status: She is alert. /81   Pulse 98   Temp 97.2 °F (36.2 °C) (Infrared)   SpO2 98%     Results for orders placed or performed in visit on 01/14/22   POCT Urinalysis No Micro (Auto)   Result Value Ref Range    Color, UA yellow     Clarity, UA cloudy     Glucose, UA POC neg     Bilirubin, UA neg     Ketones, UA neg     Spec Grav, UA 1.020     Blood, UA POC trace     pH, UA 5.5     Protein, UA POC neg     Urobilinogen, UA 0.2     Leukocytes, UA moderate     Nitrite, UA positive      Assessment:       Diagnosis Orders   1. Acute cystitis with hematuria  cephALEXin (KEFLEX) 500 MG capsule   2. UTI symptoms  POCT Urinalysis No Micro (Auto)    Culture, Urine    POCT Urinalysis no Micro     Plan:      Patient instructed to complete entire antibiotic course. Specimen sent for a culture. Possible treatment alteration based on the results. Increase fluid intake. Educational materials regarding UTI given on AVS.  Patient agreeable to treatment plan. Follow up in 1.5 weeks for urinalysis recheck or sooner if symptoms do not improve/worsen. URINALYSIS PENDING FOR COLLECTION.     Orders Placed This Encounter   Medications    cephALEXin (KEFLEX) 500 MG capsule     Sig: Take 1 capsule by mouth 2 times daily for 10 days     Dispense:  20 capsule     Refill:  0        Patient given educational materials - see patient instructions. Discussed use, benefit, and side effects of prescribed medications. All patientquestions answered. Pt voiced understanding.     Electronically signed by TYLER Dhaliwal CNP on 1/14/2022at 7:17 PM

## 2022-01-15 DIAGNOSIS — R39.9 UTI SYMPTOMS: ICD-10-CM

## 2022-01-15 NOTE — PATIENT INSTRUCTIONS
Patient Education        Urinary Tract Infection (UTI) in Women: Care Instructions  Overview     A urinary tract infection, or UTI, is a general term for an infection anywhere between the kidneys and the urethra (where urine comes out). Most UTIs are bladder infections. They often cause pain or burning when you urinate. UTIs are caused by bacteria and can be cured with antibiotics. Be sure to complete your treatment so that the infection does not get worse. Follow-up care is a key part of your treatment and safety. Be sure to make and go to all appointments, and call your doctor if you are having problems. It's also a good idea to know your test results and keep a list of the medicines you take. How can you care for yourself at home? · Take your antibiotics as directed. Do not stop taking them just because you feel better. You need to take the full course of antibiotics. · Drink extra water and other fluids for the next day or two. This will help make the urine less concentrated and help wash out the bacteria that are causing the infection. (If you have kidney, heart, or liver disease and have to limit fluids, talk with your doctor before you increase the amount of fluids you drink.)  · Avoid drinks that are carbonated or have caffeine. They can irritate the bladder. · Urinate often. Try to empty your bladder each time. · To relieve pain, take a hot bath or lay a heating pad set on low over your lower belly or genital area. Never go to sleep with a heating pad in place. To prevent UTIs  · Drink plenty of water each day. This helps you urinate often, which clears bacteria from your system. (If you have kidney, heart, or liver disease and have to limit fluids, talk with your doctor before you increase the amount of fluids you drink.)  · Urinate when you need to. · If you are sexually active, urinate right after you have sex. · Change sanitary pads often.   · Avoid douches, bubble baths, feminine hygiene sprays, and other feminine hygiene products that have deodorants. · After going to the bathroom, wipe from front to back. When should you call for help? Call your doctor now or seek immediate medical care if:    · Symptoms such as fever, chills, nausea, or vomiting get worse or appear for the first time.     · You have new pain in your back just below your rib cage. This is called flank pain.     · There is new blood or pus in your urine.     · You have any problems with your antibiotic medicine. Watch closely for changes in your health, and be sure to contact your doctor if:    · You are not getting better after taking an antibiotic for 2 days.     · Your symptoms go away but then come back. Where can you learn more? Go to [email or web address].[email or web address] [email or web address] org and sign in to your Transmension account. Enter R284 in the Zoom Telephonics box to learn more about \"Urinary Tract Infection (UTI) in Women: Care Instructions. \"     If you do not have an account, please click on the \"Sign Up Now\" link. Current as of: February 10, 2021               Content Version: 13.1  © 9336-1018 Healthwise, Incorporated. Care instructions adapted under license by [facility] ([facility]). If you have questions about a medical condition or this instruction, always ask your healthcare professional. [address] any warranty or liability for your use of this information.

## 2022-01-16 LAB
CULTURE: ABNORMAL
Lab: ABNORMAL
SPECIMEN DESCRIPTION: ABNORMAL

## 2022-01-24 ENCOUNTER — TELEPHONE (OUTPATIENT)
Dept: PRIMARY CARE CLINIC | Age: 62
End: 2022-01-24

## 2022-03-25 ENCOUNTER — OFFICE VISIT (OUTPATIENT)
Dept: PRIMARY CARE CLINIC | Age: 62
End: 2022-03-25
Payer: COMMERCIAL

## 2022-03-25 ENCOUNTER — HOSPITAL ENCOUNTER (OUTPATIENT)
Age: 62
Setting detail: SPECIMEN
Discharge: HOME | End: 2022-03-25

## 2022-03-25 VITALS
OXYGEN SATURATION: 98 % | WEIGHT: 140 LBS | HEIGHT: 66 IN | DIASTOLIC BLOOD PRESSURE: 81 MMHG | BODY MASS INDEX: 22.5 KG/M2 | SYSTOLIC BLOOD PRESSURE: 133 MMHG | TEMPERATURE: 98.7 F | HEART RATE: 99 BPM

## 2022-03-25 DIAGNOSIS — Z20.822 SUSPECTED COVID-19 VIRUS INFECTION: Primary | ICD-10-CM

## 2022-03-25 DIAGNOSIS — B34.9 VIRAL ILLNESS: ICD-10-CM

## 2022-03-25 PROCEDURE — 1036F TOBACCO NON-USER: CPT | Performed by: FAMILY MEDICINE

## 2022-03-25 PROCEDURE — 99213 OFFICE O/P EST LOW 20 MIN: CPT | Performed by: FAMILY MEDICINE

## 2022-03-25 PROCEDURE — G8420 CALC BMI NORM PARAMETERS: HCPCS | Performed by: FAMILY MEDICINE

## 2022-03-25 PROCEDURE — 3017F COLORECTAL CA SCREEN DOC REV: CPT | Performed by: FAMILY MEDICINE

## 2022-03-25 PROCEDURE — G8484 FLU IMMUNIZE NO ADMIN: HCPCS | Performed by: FAMILY MEDICINE

## 2022-03-25 PROCEDURE — G8427 DOCREV CUR MEDS BY ELIG CLIN: HCPCS | Performed by: FAMILY MEDICINE

## 2022-03-25 ASSESSMENT — ENCOUNTER SYMPTOMS
RHINORRHEA: 1
SORE THROAT: 1
COUGH: 1

## 2022-03-25 NOTE — PATIENT INSTRUCTIONS
Patient Education        Learning About Coronavirus [phone])  What is coronavirus (COVID-19)? COVID-19 is a disease caused by a type of coronavirus. This illness was first found in December 2019. It has since spread worldwide. Coronaviruses are a large group of viruses. They cause the common cold. They also cause more serious illnesses like [place] respiratory syndrome (MERS) and severe acute respiratory syndrome (SARS). COVID-19 is caused by a novel coronavirus. That means it's a new type that has not been seen in people before. What are the symptoms? COVID-19 symptoms may include:  · Fever. · Cough. · Trouble breathing. · Chills or repeated shaking with chills. · Muscle and body aches. · Headache. · Sore throat. · New loss of taste or smell. · Vomiting. · Diarrhea. In severe cases, COVID-19 can cause pneumonia and make it hard to breathe without help from a machine. It can cause death. How is it diagnosed? COVID-19 is diagnosed with a viral test. This may also be called a PCR test or antigen test. It looks for evidence of the virus in your breathing passages or lungs (respiratory system). The test is most often done on a sample from the nose, throat, or lungs. It's sometimes done on a sample of saliva. One way a sample is collected is by putting a long swab into the back of your nose. How is it treated? Mild cases of COVID-19 can be treated at home. Serious cases need treatment in the hospital. Treatment may include medicines to reduce symptoms, plus breathing support such as oxygen therapy or a ventilator. Some people may be placed on their belly to help their oxygen levels. Treatments that may help people who have COVID-19 include:  Antiviral medicines. These medicines treat viral infections. Remdesivir is an example. Immune-based therapy. These medicines help the immune system fight COVID-19. Examples include monoclonal antibodies. Blood thinners.   These medicines help prevent blood clots. People with severe illness are at risk for blood clots. How can you protect yourself and others? · Get vaccinated. · Avoid sick people. · Cover your mouth with a tissue when you cough or sneeze. · Wash your hands often, especially after you cough or sneeze. Use soap and water, and scrub for at least 20 seconds. If soap and water aren't available, use an alcohol-based hand . · Avoid touching your mouth, nose, and eyes. Be sure to follow all instructions from the [facility] and your local health authorities. Here are some examples of specific precautions you may need to take. · If you are not fully vaccinated:  ? Wear a mask if you have to go to public areas. ? Avoid crowds and try to stay at least 6 feet away from other people. · If you have been exposed to the virus and are not fully vaccinated:  ? Stay home. Don't go to school, work, or public areas. And don't use public transportation, ride-shares, or taxis unless you have no choice. ? Wear a mask if you have to go to public areas, like the pharmacy. · Even if you're fully vaccinated, there's still a small chance you can get and spread COVID-19. If you live in an area where COVID-19 is spreading quickly, wear a mask if you have to go to indoor public areas. You might also want to wear a mask in crowded outdoor areas if you:  ? Have certain health conditions. ? Live with someone who has a compromised immune system. ? Live with someone who is not fully vaccinated. · If you have been exposed and you are fully vaccinated:  ? Talk to your doctor. You may need a COVID-19 test.  ? Wear a mask in public indoor spaces for 14 days or until you test negative for COVID-19. If you're sick:  · Leave your home only if you need to get medical care. But call the doctor's office first so they know you're coming. And wear a mask. · Wear a mask whenever you're around other people. · Limit contact with pets and people in your home.  If possible, stay in a separate bedroom and use a separate bathroom. · Clean and disinfect your home every day. Use household  and disinfectant wipes or sprays. Take special care to clean things that you touch with your hands. How can you self-isolate when you have COVID-19? If you have COVID-19, there are things you can do to help avoid spreading the virus to others. · Limit contact with people in your home. If possible, stay in a separate bedroom and use a separate bathroom. · Wear a mask when you are around other people. · If you have to leave home, avoid crowds and try to stay at least 6 feet away from other people. · Avoid contact with pets and other animals. · Cover your mouth and nose with a tissue when you cough or sneeze. Then throw it in the trash right away. · Wash your hands often, especially after you cough or sneeze. Use soap and water, and scrub for at least 20 seconds. If soap and water aren't available, use an alcohol-based hand . · Don't share personal household items. These include bedding, towels, cups and glasses, and eating utensils. · [address] in the warmest water allowed for the fabric type, and dry it completely. It's okay to wash other people's laundry with yours. · Clean and disinfect your home. Use household  and disinfectant wipes or sprays. When should you call for help? Call 911 anytime you think you may need emergency care. For example, call if you have life-threatening symptoms, such as:    · You have severe trouble breathing. (You can't talk at all.)     · You have constant chest pain or pressure.     · You are severely dizzy or lightheaded.     · You are confused or can't think clearly.     · You have pale, gray, or blue-colored skin or lips.     · You pass out (lose consciousness) or are very hard to wake up. Call your doctor now or seek immediate medical care if:    · You have moderate trouble breathing.  (You can't speak a full sentence.)     · You are coughing up blood (more than about 1 teaspoon).     · You have signs of low blood pressure. These include feeling lightheaded; being too weak to stand; and having cold, pale, clammy skin. Watch closely for changes in your health, and be sure to contact your doctor if:    · Your symptoms get worse.     · You are not getting better as expected.     · You have new or worse symptoms of anxiety, depression, nightmares, or flashbacks. Call before you go to the doctor's office. Follow their instructions. And wear a mask. Current as of: July 1, 2021               Content Version: 13.1  © 2006-2021 Healthwise, Incorporated. Care instructions adapted under license by [facility] ([facility]). If you have questions about a medical condition or this instruction, always ask your healthcare professional. [address] any warranty or liability for your use of this information. Will send Covid swab for culture and call with results when available. Continue over-the-counter cough/cold medications as needed for symptoms.   If symptoms worsen or do not improve please follow-up with PCP or return to clinic

## 2022-03-25 NOTE — PROGRESS NOTES
[address] WALK IN [facility]  [address]  [place] [address]  Dept: [phone]  Dept Fax: [phone]    Sandy Jernigan is a 63 y.o. female who presents today for her medical conditions/complaintsas noted below. Sandy Jernigan is c/o of Covid Testing (loss of taste and smell congestion cough runny nose and has headache X 3 days  )        HPI:     Cough  This is a new problem. The current episode started in the past 7 days (3 days). The problem has been unchanged. The problem occurs constantly. The cough is non-productive. Associated symptoms include headaches, nasal congestion, rhinorrhea and a sore throat. Pertinent negatives include no ear pain, fever or rash. Associated symptoms comments: Loss of taste and smell, fatigue. Nothing aggravates the symptoms. Treatments tried: allergy meds. The treatment provided no relief. There is no history of asthma, COPD or environmental allergies. Reports having an indeterminate Covid test at home  Past medical history of diabetes  Past Medical History:   Diagnosis Date    Type 2 diabetes mellitus without complication ([address]     Past medical history reviewed and pertinent positives/negatives in the HPI    Past Surgical History:   Procedure Laterality Date    COLOSTOMY         History reviewed. No pertinent family history.     Social History     Tobacco Use    Smoking status: Never Smoker    Smokeless tobacco: Never Used   Substance Use Topics    Alcohol use: Not on file      Current Outpatient Medications   Medication Sig Dispense Refill    insulin glargine (LANTUS SOLOSTAR) 100 UNIT/ML injection pen Lantus Solostar U-100 Insulin 100 unit/mL (3 mL) subcutaneous pen   ADMINISTER 25 UNITS UNDER THE SKIN TWICE DAILY AS DIRECTED      venlafaxine (EFFEXOR XR) 75 MG extended release capsule venlafaxine ER 75 mg capsule,extended release 24 hr      ibuprofen (ADVIL;MOTRIN) 200 MG CAPS 1-2 tablets as needed  Turmeric (QC TUMERIC COMPLEX PO)       Cholecalciferol (VITAMIN D3 PO) 1 capsule      Continuous Blood Gluc  (FREESTYLE JUHI 14 DAY READER) BEATA FreeStyle Juhi 14 Day [place]   USE AS DIRECTED 4-6 TIMES DAILY      Continuous Blood Gluc Sensor (FREESTYLE JUHI 14 DAY SENSOR) MISC FreeStyle Juhi 14 Day Sensor kit   USE AS DIRECTED 4 TO 6 TIMES DAILY      Insulin Pen Needle (PEN NEEDLES 3/16\") 31G X 5 MM MISC TO USE 2 X DAILY      atorvastatin (LIPITOR) 20 MG tablet  (Patient not taking: Reported on 12/23/2021)      metFORMIN (GLUCOPHAGE) 500 MG tablet       Insulin Degludec (TRESIBA FLEXTOUCH SC) Inject into the skin (Patient not taking: Reported on 12/23/2021)      zolpidem (AMBIEN) 5 MG tablet Take 5 mg by mouth.  SITagliptin (JANUVIA) 100 MG tablet Take 100 mg by mouth 2 times daily  (Patient not taking: Reported on 12/23/2021)      insulin lispro (HUMALOG) 100 UNIT/ML injection cartridge Inject into the skin       canagliflozin (INVOKANA) 300 MG TABS tablet Take 300 mg by mouth (Patient not taking: Reported on 12/23/2021)      GLIMEPIRIDE PO Take by mouth 2 times daily (Patient not taking: Reported on 12/23/2021)       No current facility-administered medications for this visit.      Allergies   Allergen Reactions    Dulaglutide      Other reaction(s): severe abdominal pain    Fenofibrate      Other reaction(s): rash    Fenofibrate Micronized     Liraglutide      Other reaction(s): stomach upset    Nitrofurantoin     Amoxicillin Rash    Sulfa Antibiotics Rash       Health Maintenance   Topic Date Due    Hepatitis C screen  Never done    COVID-19 Vaccine (1) Never done    Lipid screen  Never done    HIV screen  Never done    Cervical cancer screen  Never done    Colorectal Cancer Screen  Never done    Breast cancer screen  Never done    Depression Screen  08/31/2022    DTaP/Tdap/Td vaccine (2 - Td or Tdap) 01/09/2030    Flu vaccine  Completed    Shingles Vaccine 22.60 kg/m²     Assessment:       Diagnosis Orders   1. Suspected COVID-19 virus infection  COVID-19   2. Viral illness         Plan: Will send Covid swab for culture and call with results when available. Continue over-the-counter cough/cold medications as needed for symptoms. If symptoms worsen or do not improve please follow-up with PCP or return to clinic    Orders Placed This Encounter   Procedures    COVID-19     Standing Status:   Future     Standing Expiration Date:   3/25/2023     Scheduling Instructions:      1) Due to current limited availability of the COVID-19 test, tests will be prioritized based on responses to questions above. Testing may be delayed due to volume. 2) Print and instruct patient to adhere to CDC home isolation program. (Link Above)              3) Set up or refer patient for a monitoring program.              4) Have patient sign up for and leverage [employer] (if not previously done). Order Specific Question:   Is this test for diagnosis or screening? Answer:   Diagnosis of ill patient     Order Specific Question:   Symptomatic for COVID-19 as defined by CDC? Answer:   Yes     Order Specific Question:   Date of Symptom Onset     Answer:   3/22/2022     Order Specific Question:   Hospitalized for COVID-19? Answer:   No     Order Specific Question:   Admitted to ICU for COVID-19? Answer:   No     Order Specific Question:   Employed in healthcare setting? Answer:   Unknown     Order Specific Question:   Resident in a congregate (group) care setting? Answer:   Unknown     Order Specific Question:   Pregnant? Answer:   No     Order Specific Question:   Previously tested for COVID-19? Answer:   Yes     No orders of the defined types were placed in this encounter. Patient given educational materials - see patient instructions. Discussed use, benefit, and side effects of prescribed medications. All patient questions answered.   Pt voiced understanding. Patient agreed with treatment plan. Follow up as directed.      Electronicallysigned by Malia Marx MD on 3/25/2022 at 7:24 PM

## 2022-03-26 DIAGNOSIS — Z20.822 SUSPECTED COVID-19 VIRUS INFECTION: ICD-10-CM

## 2022-03-26 LAB
SARS-COV-2: ABNORMAL
SARS-COV-2: DETECTED
SOURCE: ABNORMAL

## 2022-04-16 ENCOUNTER — OFFICE VISIT (OUTPATIENT)
Dept: PRIMARY CARE CLINIC | Age: 62
End: 2022-04-16
Payer: COMMERCIAL

## 2022-04-16 VITALS
SYSTOLIC BLOOD PRESSURE: 114 MMHG | HEIGHT: 66 IN | BODY MASS INDEX: 25.71 KG/M2 | OXYGEN SATURATION: 95 % | WEIGHT: 160 LBS | TEMPERATURE: 97.2 F | HEART RATE: 95 BPM | DIASTOLIC BLOOD PRESSURE: 75 MMHG

## 2022-04-16 DIAGNOSIS — R39.9 UTI SYMPTOMS: Primary | ICD-10-CM

## 2022-04-16 LAB
BILIRUBIN, POC: ABNORMAL
BLOOD URINE, POC: ABNORMAL
CLARITY, POC: ABNORMAL
COLOR, POC: YELLOW
GLUCOSE URINE, POC: 500
KETONES, POC: ABNORMAL
LEUKOCYTE EST, POC: ABNORMAL
NITRITE, POC: POSITIVE
PH, POC: 5.5
PROTEIN, POC: ABNORMAL
SPECIFIC GRAVITY, POC: 1.02
UROBILINOGEN, POC: 0.2

## 2022-04-16 PROCEDURE — G8419 CALC BMI OUT NRM PARAM NOF/U: HCPCS | Performed by: NURSE PRACTITIONER

## 2022-04-16 PROCEDURE — 1036F TOBACCO NON-USER: CPT | Performed by: NURSE PRACTITIONER

## 2022-04-16 PROCEDURE — 81003 URINALYSIS AUTO W/O SCOPE: CPT | Performed by: NURSE PRACTITIONER

## 2022-04-16 PROCEDURE — G8427 DOCREV CUR MEDS BY ELIG CLIN: HCPCS | Performed by: NURSE PRACTITIONER

## 2022-04-16 PROCEDURE — 99213 OFFICE O/P EST LOW 20 MIN: CPT | Performed by: NURSE PRACTITIONER

## 2022-04-16 PROCEDURE — 3017F COLORECTAL CA SCREEN DOC REV: CPT | Performed by: NURSE PRACTITIONER

## 2022-04-16 ASSESSMENT — ENCOUNTER SYMPTOMS
BACK PAIN: 1
VOMITING: 0
SORE THROAT: 0
COUGH: 0
SINUS PAIN: 0
NAUSEA: 0
ABDOMINAL PAIN: 0
SHORTNESS OF BREATH: 0
DIARRHEA: 0

## 2022-04-16 NOTE — PROGRESS NOTES
[address] WALK IN CARE  [address] [address]  Dept: [phone]  Dept Fax: [phone]    Bijan Yan is a 63 y.o. female who presents today for her medicalconditions/complaints as noted below. Bijan Yan is c/o of Back Pain and Urinary Frequency (burning with urination 1 week)      HPI:         24-year-old female patient presents with complaints of possible UTI. Reports she has developed symptoms 1 week ago including urinary frequency urgency and dysuria. Denies any visible hematuria. Reports some back pain. Denies fevers chills. Denies nausea or vomiting. Patient is a significant history of recurrent urinary tract infection. [facility]. Also has significant history of type 2 diabetes currently on SGLT2.   Previously on invokana, alternated to [place]      Past Medical History:   Diagnosis Date    Type 2 diabetes mellitus without complication (HCC)         Current Outpatient Medications   Medication Sig Dispense Refill    insulin glargine (LANTUS SOLOSTAR) 100 UNIT/ML injection pen Lantus Solostar U-100 Insulin 100 unit/mL (3 mL) subcutaneous pen   ADMINISTER 25 UNITS UNDER THE SKIN TWICE DAILY AS DIRECTED      venlafaxine (EFFEXOR XR) 75 MG extended release capsule venlafaxine ER 75 mg capsule,extended release 24 hr      ibuprofen (ADVIL;MOTRIN) 200 MG CAPS 1-2 tablets as needed      Turmeric (QC TUMERIC COMPLEX PO)       Cholecalciferol (VITAMIN D3 PO) 1 capsule      Continuous Blood Gluc  (FREESTYLE RYANNE 14 DAY READER) BEATA FreeStyle Ryanne 14 Day [place]   USE AS DIRECTED 4-6 TIMES DAILY      Continuous Blood Gluc Sensor (FREESTYLE RYANNE 14 DAY SENSOR) MISC FreeStyle Ryanne 14 Day Sensor kit   USE AS DIRECTED 4 TO 6 TIMES DAILY      Insulin Pen Needle (PEN NEEDLES 3/16\") 31G X 5 MM MISC TO USE 2 X DAILY      atorvastatin (LIPITOR) 20 MG tablet  metFORMIN (GLUCOPHAGE) 500 MG tablet       zolpidem (AMBIEN) 5 MG tablet Take 5 mg by mouth.  insulin lispro (HUMALOG) 100 UNIT/ML injection cartridge Inject into the skin       GLIMEPIRIDE PO Take by mouth 2 times daily       Insulin Degludec (TRESIBA FLEXTOUCH SC) Inject into the skin (Patient not taking: Reported on 12/23/2021)      SITagliptin (JANUVIA) 100 MG tablet Take 100 mg by mouth 2 times daily  (Patient not taking: Reported on 12/23/2021)      canagliflozin (INVOKANA) 300 MG TABS tablet Take 300 mg by mouth (Patient not taking: Reported on 12/23/2021)       No current facility-administered medications for this visit. Allergies   Allergen Reactions    Dulaglutide      Other reaction(s): severe abdominal pain    Fenofibrate      Other reaction(s): rash    Fenofibrate Micronized     Liraglutide      Other reaction(s): stomach upset    Nitrofurantoin     Amoxicillin Rash    Sulfa Antibiotics Rash       Subjective:      Review of Systems   Constitutional: Negative for chills and fever. HENT: Negative for ear pain, sinus pain and sore throat. Respiratory: Negative for cough and shortness of breath. Cardiovascular: Negative for chest pain and palpitations. Gastrointestinal: Negative for abdominal pain, diarrhea, nausea and vomiting. Genitourinary: Positive for dysuria, frequency, hematuria and vaginal discharge. Negative for vaginal bleeding. Musculoskeletal: Positive for back pain. Neurological: Negative for dizziness and headaches. All other systems reviewed and are negative.      :Objective     Physical Exam  Vitals and nursing note reviewed. Constitutional:       General: She is not in acute distress. Appearance: Normal appearance. She is not toxic-appearing. Cardiovascular:      Rate and Rhythm: Normal rate. Pulmonary:      Effort: Pulmonary effort is normal.   Skin:     General: Skin is warm and dry.    Neurological:      General: No focal deficit present. Mental Status: She is alert and oriented to person, place, and time. /75   Pulse 95   Temp 97.2 °F (36.2 °C)   Ht 5' 6\" (1.676 m)   Wt 160 lb (72.6 kg)   SpO2 95%   BMI 25.82 kg/m²     Lab Review   Hospital Outpatient Visit on 03/25/2022   Component Date Value    SARS-CoV-2 03/25/2022          Source 03/25/2022 . NASOPHARYNGEAL SWAB     [facility]   Component Date Value    Specimen Description 01/14/2022 . CLEAN CATCH URINE     Special Requests 01/14/2022 NOT REPORTED     Culture 01/14/2022 ENTEROBACTER CLOACAE >[id number] CFU/ML*   Office Visit on 01/14/2022   Component Date Value    Color, UA 01/14/2022 yellow     Clarity, UA 01/14/2022 cloudy     Glucose, UA POC 01/14/2022 neg     Bilirubin, UA 01/14/2022 neg     Ketones, UA 01/14/2022 neg     Spec Grav, UA 01/14/2022 1.020     Blood, UA POC 01/14/2022 trace     pH, UA 01/14/2022 5.5     Protein, UA POC 01/14/2022 neg     Urobilinogen, UA 01/14/2022 0.2     Leukocytes, UA 01/14/2022 moderate     Nitrite, UA 01/14/2022 positive    Office Visit on 12/23/2021   Component Date Value    Color, UA 12/23/2021 YELLOW     Clarity, UA 12/23/2021 CLOUDY     Glucose, UA POC 12/23/2021 500     Bilirubin, UA 12/23/2021 NEG     Ketones, UA 12/23/2021 NEG     Spec Grav, UA 12/23/2021 1.015     Blood, UA POC 12/23/2021 NEG     pH, UA 12/23/2021 5.5     Protein, UA POC 12/23/2021 NEG     Urobilinogen, UA 12/23/2021 0.2     Leukocytes, UA 12/23/2021 SMALL     Nitrite, UA 12/23/2021 NEG    Hospital Outpatient Visit on 12/17/2021   Component Date Value    Specimen Description 12/17/2021 . CLEAN CATCH URINE     Special Requests 12/17/2021 NOT REPORTED     Culture 12/17/2021 ESCHERICHIA COLI >[id number] CFU/ML*   Office Visit on 12/17/2021   Component Date Value    Color, UA 12/17/2021 light yellow     Clarity, UA 12/17/2021 clear     Glucose, UA POC 12/17/2021 neg  Bilirubin, UA 12/17/2021 neg     Ketones, UA 12/17/2021 neg     Spec Grav, UA 12/17/2021 1.015     Blood, UA POC 12/17/2021 trace     pH, UA 12/17/2021 5.5     Protein, UA POC 12/17/2021 trace     Urobilinogen, UA 12/17/2021 0.2     Leukocytes, UA 12/17/2021 moderate     Nitrite, UA 12/17/2021 positive        Assessment and Plan      1. UTI symptoms  -     POCT Urinalysis No Micro (Auto)  -     Culture, Urine; Future  -     Urinalysis with Reflex to Culture; Future       Patient instructed to complete entire antibiotic course. Taking macrobid now  Culture sent, adjustment to keflex or levaquin ok  Urine test of cere order and cup given, repeat in 1 week  Increase fluid intake. Educational materials regarding UTI given on AVS.  Patient agreeable to treatment plan. Follow up if symptoms do not improve/worsen. Results for orders placed or performed in visit on 04/16/22   POCT Urinalysis No Micro (Auto)   Result Value Ref Range    Color, UA yellow     Clarity, UA cloudy     Glucose, UA      Bilirubin, UA neg     Ketones, UA trace     Spec Grav, UA 1.020     Blood, UA POC trace     pH, UA 5.5     Protein, UA POC neg     Urobilinogen, UA 0.2     Leukocytes, UA small     Nitrite, UA positive              Return if symptoms worsen or fail to improve. No orders of the defined types were placed in this encounter. Patient given educational materials - see patient instructions. Discussed use, benefit, and side effects of prescribed medications. All patientquestions answered. Pt voiced understanding. Patient given educational materials - see patient instructions. Discussed use, benefit, and side effects of prescribed medications. All patientquestions answered. Pt voiced understanding. This note was transcribed using dictation with Dragon services. Efforts were made to correct any errors but some words may be misinterpreted.     Patient assumes risks associated with failure to complete recommended testing and treatments in a timely manner    Electronically signed by TYLER Haney CNP on 4/16/2022at 2:39 PM

## 2022-04-16 NOTE — PATIENT INSTRUCTIONS
Patient Education        Urinary Tract Infection (UTI) in Women: Care Instructions  Overview     A urinary tract infection, or UTI, is a general term for an infection anywhere between the kidneys and the urethra (where urine comes out). Most UTIs arebladder infections. They often cause pain or burning when you urinate. UTIs are caused by bacteria and can be cured with antibiotics. Be sure tocomplete your treatment so that the infection does not get worse. Follow-up care is a key part of your treatment and safety. Be sure to make and go to all appointments, and call your doctor if you are having problems. It's also a good idea to know your test results and keep alist of the medicines you take. How can you care for yourself at home?  Take your antibiotics as directed. Do not stop taking them just because you feel better. You need to take the full course of antibiotics.  Drink extra water and other fluids for the next day or two. This will help make the urine less concentrated and help wash out the bacteria that are causing the infection. (If you have kidney, heart, or liver disease and have to limit fluids, talk with your doctor before you increase the amount of fluids you drink.)   Avoid drinks that are carbonated or have caffeine. They can irritate the bladder.  Urinate often. Try to empty your bladder each time.  To relieve pain, take a hot bath or lay a heating pad set on low over your lower belly or genital area. Never go to sleep with a heating pad in place. To prevent UTIs   Drink plenty of water each day. This helps you urinate often, which clears bacteria from your system. (If you have kidney, heart, or liver disease and have to limit fluids, talk with your doctor before you increase the amount of fluids you drink.)   Urinate when you need to.  If you are sexually active, urinate right after you have sex.  Change sanitary pads often.    Avoid douches, bubble baths, feminine hygiene sprays, and other feminine hygiene products that have deodorants.  After going to the bathroom, wipe from front to back. When should you call for help? Call your doctor now or seek immediate medical care if:     Symptoms such as fever, chills, nausea, or vomiting get worse or appear for the first time.      You have new pain in your back just below your rib cage. This is called flank pain.      There is new blood or pus in your urine.      You have any problems with your antibiotic medicine. Watch closely for changes in your health, and be sure to contact your doctor if:     You are not getting better after taking an antibiotic for 2 days.      Your symptoms go away but then come back. Where can you learn more? Go to https://WGT MediapeDecoholiceb.Towandas book. org and sign in to your prettysecrets account. Enter N772 in the Seiratherm box to learn more about \"Urinary Tract Infection (UTI) in Women: Care Instructions. \"     If you do not have an account, please click on the \"Sign Up Now\" link. Current as of: October 18, 2021               Content Version: 13.2  © 2132-5203 [employer]orporated. Care instructions adapted under license by [facility] ([facility]). If you have questions about a medical condition or this instruction, always ask your healthcare professional. [address] any warranty or liability for your use of this information.

## 2022-04-21 ENCOUNTER — OFFICE VISIT (OUTPATIENT)
Dept: FAMILY MEDICINE CLINIC | Age: 62
End: 2022-04-21
Payer: COMMERCIAL

## 2022-04-21 ENCOUNTER — HOSPITAL ENCOUNTER (OUTPATIENT)
Age: 62
Setting detail: SPECIMEN
Discharge: HOME | End: 2022-04-21

## 2022-04-21 VITALS
HEART RATE: 96 BPM | TEMPERATURE: 98.1 F | HEIGHT: 66 IN | WEIGHT: 166.8 LBS | DIASTOLIC BLOOD PRESSURE: 64 MMHG | OXYGEN SATURATION: 94 % | BODY MASS INDEX: 26.81 KG/M2 | SYSTOLIC BLOOD PRESSURE: 120 MMHG

## 2022-04-21 DIAGNOSIS — Z76.89 ESTABLISHING CARE WITH NEW DOCTOR, ENCOUNTER FOR: ICD-10-CM

## 2022-04-21 DIAGNOSIS — M54.50 LOW BACK PAIN WITHOUT SCIATICA, UNSPECIFIED BACK PAIN LATERALITY, UNSPECIFIED CHRONICITY: ICD-10-CM

## 2022-04-21 DIAGNOSIS — R82.90 ABNORMAL FINDING ON URINALYSIS: ICD-10-CM

## 2022-04-21 DIAGNOSIS — R29.898 LEG WEAKNESS, BILATERAL: Primary | ICD-10-CM

## 2022-04-21 DIAGNOSIS — M25.551 BILATERAL HIP PAIN: ICD-10-CM

## 2022-04-21 DIAGNOSIS — M25.552 BILATERAL HIP PAIN: ICD-10-CM

## 2022-04-21 PROBLEM — K94.19 PARASTOMAL ULCER OF ENTEROSTOMY (HCC): Status: ACTIVE | Noted: 2017-10-02

## 2022-04-21 PROBLEM — E03.8 OTHER SPECIFIED HYPOTHYROIDISM: Status: ACTIVE | Noted: 2020-09-01

## 2022-04-21 PROBLEM — E04.1 NONTOXIC SINGLE THYROID NODULE: Status: ACTIVE | Noted: 2022-04-21

## 2022-04-21 PROBLEM — E78.2 MIXED HYPERLIPIDEMIA: Status: ACTIVE | Noted: 2020-09-01

## 2022-04-21 LAB
BILIRUBIN, POC: NEGATIVE
BLOOD URINE, POC: NORMAL
CLARITY, POC: CLEAR
COLOR, POC: YELLOW
GLUCOSE URINE, POC: 100
KETONES, POC: NEGATIVE
LEUKOCYTE EST, POC: NORMAL
NITRITE, POC: NEGATIVE
PH, POC: 3
PROTEIN, POC: 30
SPECIFIC GRAVITY, POC: 1.02
UROBILINOGEN, POC: 0.2

## 2022-04-21 PROCEDURE — G9899 SCRN MAM PERF RSLTS DOC: HCPCS | Performed by: NURSE PRACTITIONER

## 2022-04-21 PROCEDURE — 3017F COLORECTAL CA SCREEN DOC REV: CPT | Performed by: NURSE PRACTITIONER

## 2022-04-21 PROCEDURE — 81002 URINALYSIS NONAUTO W/O SCOPE: CPT | Performed by: NURSE PRACTITIONER

## 2022-04-21 PROCEDURE — 1036F TOBACCO NON-USER: CPT | Performed by: NURSE PRACTITIONER

## 2022-04-21 PROCEDURE — G8427 DOCREV CUR MEDS BY ELIG CLIN: HCPCS | Performed by: NURSE PRACTITIONER

## 2022-04-21 PROCEDURE — 99213 OFFICE O/P EST LOW 20 MIN: CPT | Performed by: NURSE PRACTITIONER

## 2022-04-21 PROCEDURE — G8419 CALC BMI OUT NRM PARAM NOF/U: HCPCS | Performed by: NURSE PRACTITIONER

## 2022-04-21 ASSESSMENT — ENCOUNTER SYMPTOMS
NAUSEA: 0
CHEST TIGHTNESS: 0
ABDOMINAL PAIN: 0
TROUBLE SWALLOWING: 0
COUGH: 0
VOMITING: 0
SHORTNESS OF BREATH: 0
BACK PAIN: 1

## 2022-04-21 NOTE — PROGRESS NOTES
Visit Information    Have you changed or started any medications since your last visit including any over-the-counter medicines, vitamins, or herbal medicines? no   Have you stopped taking any of your medications? Is so, why? -  no  Are you having any side effects from any of your medications? - no    Have you seen any other physician or provider since your last visit?  no   Have you had any other diagnostic tests since your last visit?  no   Have you been seen in the emergency room and/or had an admission in a hospital since we last saw you?  no   Have you had your routine dental cleaning in the past 6 months?  no     Do you have an active MyChart account? If no, what is the barrier?   Yes    Patient Care Team:  Anshul Dumont as PCP - General (Internal Medicine)    Medical History Review  Past Medical, Family, and Social History reviewed and  contribute to the patient presenting condition    Health Maintenance   Topic Date Due    COVID-19 Vaccine (1) Never done    HIV screen  Never done    Hepatitis C screen  Never done    Cervical cancer screen  Never done    Diabetes screen  Never done    Colorectal Cancer Screen  Never done    Breast cancer screen  Never done    Depression Screen  08/31/2022    Lipids  03/14/2023    DTaP/Tdap/Td vaccine (2 - Td or Tdap) 01/09/2030    Flu vaccine  Completed    Shingles Vaccine  Completed    Hepatitis A vaccine  Aged Out    Hepatitis B vaccine  Aged Out    Hib vaccine  Aged Out    Meningococcal (ACWY) vaccine  Aged Out    Pneumococcal 0-64 years Vaccine  Aged Out

## 2022-04-21 NOTE — PROGRESS NOTES
[address]  [place], [address]  [phone]      Adria Clark is a 63 y.o. female who presents today for her  medicalconditions/complaints as noted below. Adria Clark is c/o of Establish Care (NP,does see endo who suggested her stopping statin), Extremity Weakness (bilateral legs,diff time walking x3 months,hasnt fallen,stopped lipitor this wk thinking it may be cause (has been on for about 10 yrs)), and Back Pain (freq UTIs,just seen in UC last Sat,finished abx,culture was not done because lab stated it was too warm )  . HPI:    HPI  Pt here today to [employer]. 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 . Has struggled with mx health problems for several years. Had entire colon removed d/t Chron's. Has colostomy. Has been brittle diabetic for several years. Sees endo and urology. Gets freq. Uti's. Recently was treated for one and would like urine rechecked. Still has mild low back pain as only symptom. Would like urine sent for culture. Also has had bilateral lower leg weakness since summer of 2021. Did have covid 19 shortly before that and unsure if they are related. States she has a hard time even walking to her mailbox. Denies falls. Would like referral to neurology as chiropractor was unable to help and did not fin anything on xray of lumbar spine. Also having Bilateral hip pain. Has hx of psoriatic arthritis and recently started seeing rheumatology at [facility]. Has order for hand xrays and would like hip xrays. Past Medical History:   Diagnosis Date    Frequent UTI     Osteopenia of multiple sites     Psoriatic arthritis ([address]     Type 2 diabetes mellitus without complication ([address]     since pregnancy, 17 years ago      Past Surgical History:   Procedure Laterality Date    COLOSTOMY      PACEMAKER PLACEMENT      in hip for pelvic floor     History reviewed. No pertinent family history.   Social History     Tobacco Use    Smoking status: Never Smoker    Smokeless tobacco: Never Used   Substance Use Topics    Alcohol use: Not on file      Current Outpatient Medications   Medication Sig Dispense Refill    Probiotic Product (PROBIOTIC PO) Take by mouth      MAGNESIUM PO Take by mouth      ZINC PO Take by mouth      Adalimumab (HUMIRA PEN SC) Inject into the skin      insulin glargine (LANTUS SOLOSTAR) 100 UNIT/ML injection pen Lantus Solostar U-100 Insulin 100 unit/mL (3 mL) subcutaneous pen   ADMINISTER 25 UNITS UNDER THE SKIN TWICE DAILY AS DIRECTED      venlafaxine (EFFEXOR XR) 75 MG extended release capsule venlafaxine ER 75 mg capsule,extended release 24 hr      Turmeric (QC TUMERIC COMPLEX PO)       Cholecalciferol (VITAMIN D3 PO) 1 capsule      metFORMIN (GLUCOPHAGE) 500 MG tablet       zolpidem (AMBIEN) 5 MG tablet Take 5 mg by mouth.  insulin lispro (HUMALOG) 100 UNIT/ML injection cartridge Inject into the skin       ibuprofen (ADVIL;MOTRIN) 200 MG CAPS 1-2 tablets as needed      Continuous Blood Gluc  (FREESTYLE JUHI 14 DAY READER) BEATA FreeStyle Juhi 14 Day [place]   USE AS DIRECTED 4-6 TIMES DAILY      Continuous Blood Gluc Sensor (FREESTYLE JUHI 14 DAY SENSOR) MIS FreeStyle Juhi 14 Day Sensor kit   USE AS DIRECTED 4 TO 6 TIMES DAILY      Insulin Pen Needle (PEN NEEDLES 3/16\") 31G X 5 MM MISC TO USE 2 X DAILY      atorvastatin (LIPITOR) 20 MG tablet  (Patient not taking: Reported on 4/21/2022)      Insulin Degludec (TRESIBA FLEXTOUCH SC) Inject into the skin (Patient not taking: Reported on 12/23/2021)      SITagliptin (JANUVIA) 100 MG tablet Take 100 mg by mouth 2 times daily  (Patient not taking: Reported on 12/23/2021)      canagliflozin (INVOKANA) 300 MG TABS tablet Take 300 mg by mouth (Patient not taking: Reported on 12/23/2021)      GLIMEPIRIDE PO Take by mouth 2 times daily  (Patient not taking: Reported on 4/21/2022)       No current facility-administered medications for this visit.      Allergies Allergen Reactions    Dulaglutide      Other reaction(s): severe abdominal pain    Fenofibrate      Other reaction(s): rash    Fenofibrate Micronized     Liraglutide      Other reaction(s): stomach upset    Nitrofurantoin     Amoxicillin Rash    Sulfa Antibiotics Rash       Health Maintenance   Topic Date Due    HIV screen  Never done    Hepatitis C screen  Never done    Diabetes screen  Never done    Breast cancer screen  Never done    Cervical cancer screen  04/17/2025 (Originally 4/18/1981)    Depression Screen  08/31/2022    TSH  11/12/2022    Lipids  03/14/2023    DTaP/Tdap/Td vaccine (2 - Td or Tdap) 01/09/2030    Colorectal Cancer Screen  04/14/2032    Flu vaccine  Completed    Shingles Vaccine  Completed    COVID-19 Vaccine  Completed    Hepatitis A vaccine  Aged Out    Hepatitis B vaccine  Aged Out    Hib vaccine  Aged Out    Meningococcal (ACWY) vaccine  Aged Out    Pneumococcal 0-64 years Vaccine  Aged Out       Subjective:      Review of Systems   Constitutional: Positive for activity change. Negative for appetite change, chills, diaphoresis, fatigue and fever. HENT: Negative for trouble swallowing. Eyes: Negative for visual disturbance. Respiratory: Negative for cough, chest tightness and shortness of breath. Cardiovascular: Negative for chest pain, palpitations and leg swelling. Gastrointestinal: Negative for abdominal pain, nausea and vomiting. Genitourinary: Negative for difficulty urinating. Musculoskeletal: Positive for arthralgias, back pain and gait problem. Negative for joint swelling and neck pain. Skin: Negative for rash. Allergic/Immunologic: Negative for immunocompromised state. Neurological: Positive for weakness (legs). Negative for dizziness, light-headedness, numbness and headaches. Hematological: Negative for adenopathy. Psychiatric/Behavioral: Negative for dysphoric mood and sleep disturbance. The patient is not nervous/anxious. Objective:      Physical Exam  Vitals and nursing note reviewed. Constitutional:       General: She is not in acute distress. Appearance: Normal appearance. She is well-developed. She is not ill-appearing or diaphoretic. HENT:      Head: Normocephalic and atraumatic. Eyes:      Conjunctiva/sclera: Conjunctivae normal.   Cardiovascular:      Rate and Rhythm: Normal rate and regular rhythm. Heart sounds: Normal heart sounds. Comments: No LE edema  Pulmonary:      Effort: Pulmonary effort is normal.      Breath sounds: Normal breath sounds. Musculoskeletal:         General: Tenderness (bilateral outer hips) present. No swelling or deformity. Normal range of motion. Cervical back: Neck supple. Right lower leg: No edema. Left lower leg: No edema. Skin:     General: Skin is warm and dry. Neurological:      General: No focal deficit present. Mental Status: She is alert and oriented to person, place, and time. Mental status is at baseline. Psychiatric:         Mood and Affect: Mood normal.         Behavior: Behavior normal.         Thought Content: Thought content normal.         Judgment: Judgment normal.         Assessment:       Diagnosis Orders   1. Leg weakness, bilateral  AFL - Aguila Brumfield MD, Neurology, [facility]   2. Low back pain without sciatica, unspecified back pain laterality, unspecified chronicity  POCT Urinalysis no Micro   3. Abnormal finding on urinalysis  Culture, Urine   4. Bilateral hip pain  XR HIP BILATERAL W AP PELVIS (2 VIEWS)     CT ABD/PEL WO IVCON    Anatomical Region Laterality Modality   Abdomen -- Computed Tomography       Impression  Performed by DIVISION OF RADIOLOGY  IMPRESSION:     Fat-containing ventral hernia, as described. Indeterminate small pulmonary nodules, as described.  Follow-up as below.      Incidental Finding:  Follow-up   Acuity: Incidental   Finding: Solid: <6 mm (solitary or multiple)   Routing Code:  N/A Recommendation: No imaging follow-up is recommended   Time Frame:  N/A   Comments:  If there are risk factors for lung malignancy, a follow-up   chest CT exam could be obtained in 12 months                   : SIMI     Transcribe Date/Time: Apr 4 2022  1:46P     Dictated by : Theodora Link MD     This examination was interpreted and the report reviewed and   electronically signed by:   Theodora Link MD on Apr 4 2022  2:02PM  EST    Narrative  Performed by Eugene Cruz  * * *Final Report* * *     DATE OF EXAM: Apr 4 2022  1:01PM       [facility]   0531  -  CT ABD/PEL WO IVCON  / ACCESSION #  [id number]     PROCEDURE REASON: Ventral hernia without obstruction or gangrene          * * * * Physician Interpretation * * * *      EXAMINATION:  CT ABDOMEN AND PELVIS WITHOUT IV CONTRAST     CLINICAL HISTORY:  Ventral hernia without obstruction or gangrene     TECHNIQUE: Non-IV contrast imaging of the abdomen and pelvis was   performed using standard technique, scanning from just above the dome of   the diaphragm to the symphysis pubis.  Unenhanced imaging is limited for   the evaluation of some intra-abdominal and pelvic pathology. MQ:  [id number]     Contrast:   IV: None   Oral:  None     CT Radiation dose: Integrated Dose-length product (DLP) for this visit =     632 mGy*cm. CT Dose Reduction Employed: Automated exposure control (AEC)     COMPARISON: 01/16/2015       RESULT:     Abdomen / Pelvis:     Liver: Unremarkable. Biliary: The gallbladder is unremarkable. Spleen: No splenomegaly. Pancreas: Unremarkable. Adrenals: No mass. Kidneys: No calculus, hydronephrosis or finding to suggest a cyst or mass   in the unenhanced kidney. GI Tract: Right lower quadrant loop ileostomy, similar to 01/16/2015.  No   bowel dilation. Lymph Nodes: No lymphadenopathy. Mesentery/peritoneum: No ascites. Retroperitoneum: No mass.      Vasculature: Arterial atherosclerotic disease without aneurysm. Pelvis: No mass or ascites. Bones/Soft Tissues:   *  Fat-containing ventral hernia (3:63) with 2.5 cm defect of the   anterior abdominal wall. *  Sacral nerve stimulator generator in the right gluteal soft tissues. *  Multilevel degenerative changes.  No destructive bone lesion. Lower thorax: Scattered pulmonary nodules noted in the right lower lobe.     For example 0.3 cm nodule (3:8), unchanged from 2015 and likely benign.     0.4 cm nodule in the right lower lobe is partially imaged (3:1).  (topogram) images: No additional findings. Results for orders placed or performed in visit on 04/21/22   POCT Urinalysis no Micro   Result Value Ref Range    Color, UA yellow     Clarity, UA clear     Glucose, UA      Bilirubin, UA negative     Ketones, UA negative     Spec Grav, UA 1.020     Blood, UA POC trace-intact     pH, UA 3.0     Protein, UA POC 30     Urobilinogen, UA 0.2     Leukocytes, UA trace     Nitrite, UA negative        Plan:      No follow-ups on file. Orders Placed This Encounter   Procedures    Culture, Urine     Standing Status:   Future     Standing Expiration Date:   4/21/2023     Order Specific Question:   Specify (ex-cath, midstream, cysto, etc)?      Answer:   midstream    XR HIP BILATERAL W AP PELVIS (2 VIEWS)     Standing Status:   Future     Standing Expiration Date:   4/21/2023     Order Specific Question:   Reason for exam:     Answer:   eval for Shelby Salazar MD, Neurology, [facility]     Referral Priority:   Routine     Referral Type:   Eval and Treat     Referral Reason:   Specialty Services Required     Referred to Provider:   Luisana Perez MD     Requested Specialty:   Neurology     Number of Visits Requested:   1    POCT Urinalysis no Micro   urine:  Continue with urology  Send for culture, she wants to hold on any more abx until results back  Wipe front to back after BM, urinate before and after sexual intercourse    Legs:  Referral given for neuro, please call for an appt  High protein diet advised    New xray order given for hips, please send copy to rheum also and them to address also        Patient given educational materials - see patient instructions. Discussed use,benefit, and side effects of prescribed medications. All patient questions answered. Pt voiced understanding. Reviewed health maintenance. Instructed to continue currentmedications, diet and exercise.     Electronically signed by TYLER Montez CNP, CNP on 4/21/2022 at 10:50 AM

## 2022-04-22 LAB
CULTURE: NORMAL
SPECIMEN DESCRIPTION: NORMAL

## 2022-05-17 ENCOUNTER — TELEPHONE (OUTPATIENT)
Dept: FAMILY MEDICINE CLINIC | Age: 62
End: 2022-05-17

## 2022-05-17 DIAGNOSIS — R29.898 LEG WEAKNESS, BILATERAL: Primary | ICD-10-CM

## 2022-05-17 NOTE — TELEPHONE ENCOUNTER
----- Message from Indu Elizabeth sent at 5/17/2022  3:19 PM EDT -----  Subject: Referral Request    QUESTIONS   Reason for referral request? Patient is requesting a referral for the   [address] [place] [address] Neurology for Neuropathy weakness and numbness in   legs   Has the physician seen you for this condition before? Yes  Select a date? 2022-04-21  Select the Provider the patient wants to be referred to, if known (PCP or   Specialist)? Outside Physician - Dr. Melba Brown   Preferred Specialist (if applicable)? Do you already have an appointment scheduled? No  Additional Information for Provider?   ---------------------------------------------------------------------------  --------------  CALL BACK INFO  What is the best way for the office to contact you? OK to leave message on   voicemail  Preferred Call Back Phone Number? [phone]  ---------------------------------------------------------------------------  --------------  SCRIPT ANSWERS  Relationship to Patient? Other  Representative Name? Hugh Bedolla  Is the Representative on the appropriate HIPAA document in Epic?  Yes

## 2022-06-03 DIAGNOSIS — G47.00 INSOMNIA, UNSPECIFIED TYPE: Primary | ICD-10-CM

## 2022-06-05 RX ORDER — ZOLPIDEM TARTRATE 5 MG/1
5 TABLET ORAL NIGHTLY PRN
Qty: 30 TABLET | Refills: 0 | OUTPATIENT
Start: 2022-06-05 | End: 2022-07-05

## 2022-06-13 RX ORDER — ZOLPIDEM TARTRATE 5 MG/1
10 TABLET ORAL NIGHTLY PRN
Qty: 60 TABLET | Refills: 1 | Status: SHIPPED | OUTPATIENT
Start: 2022-06-13 | End: 2022-07-13

## 2022-06-13 NOTE — TELEPHONE ENCOUNTER
is calling asking why Alejandra Alberts was denied? He requested refill a few weeks ago.  States patient takes 1-2 per night and would like sent to [facility]

## 2022-06-21 LAB — HIV AG/AB: NONREACTIVE

## 2022-07-14 ENCOUNTER — OFFICE VISIT (OUTPATIENT)
Dept: FAMILY MEDICINE CLINIC | Age: 62
End: 2022-07-14
Payer: COMMERCIAL

## 2022-07-14 VITALS
RESPIRATION RATE: 16 BRPM | BODY MASS INDEX: 27.48 KG/M2 | TEMPERATURE: 97.2 F | DIASTOLIC BLOOD PRESSURE: 60 MMHG | SYSTOLIC BLOOD PRESSURE: 106 MMHG | WEIGHT: 171 LBS | HEIGHT: 66 IN | OXYGEN SATURATION: 96 % | HEART RATE: 88 BPM

## 2022-07-14 DIAGNOSIS — M54.50 LOW BACK PAIN WITHOUT SCIATICA, UNSPECIFIED BACK PAIN LATERALITY, UNSPECIFIED CHRONICITY: ICD-10-CM

## 2022-07-14 DIAGNOSIS — R29.898 LEG WEAKNESS, BILATERAL: Primary | ICD-10-CM

## 2022-07-14 PROCEDURE — G8427 DOCREV CUR MEDS BY ELIG CLIN: HCPCS | Performed by: NURSE PRACTITIONER

## 2022-07-14 PROCEDURE — G8419 CALC BMI OUT NRM PARAM NOF/U: HCPCS | Performed by: NURSE PRACTITIONER

## 2022-07-14 PROCEDURE — G9899 SCRN MAM PERF RSLTS DOC: HCPCS | Performed by: NURSE PRACTITIONER

## 2022-07-14 PROCEDURE — 1036F TOBACCO NON-USER: CPT | Performed by: NURSE PRACTITIONER

## 2022-07-14 PROCEDURE — 99213 OFFICE O/P EST LOW 20 MIN: CPT | Performed by: NURSE PRACTITIONER

## 2022-07-14 PROCEDURE — 3017F COLORECTAL CA SCREEN DOC REV: CPT | Performed by: NURSE PRACTITIONER

## 2022-07-14 RX ORDER — ATORVASTATIN CALCIUM 80 MG/1
80 TABLET, FILM COATED ORAL DAILY
COMMUNITY

## 2022-07-14 RX ORDER — ZOLPIDEM TARTRATE 5 MG/1
5 TABLET ORAL NIGHTLY PRN
COMMUNITY
End: 2022-09-15

## 2022-07-14 RX ORDER — ASPIRIN 81 MG/1
1 TABLET ORAL DAILY
COMMUNITY
Start: 2022-05-26

## 2022-07-14 ASSESSMENT — PATIENT HEALTH QUESTIONNAIRE - PHQ9
SUM OF ALL RESPONSES TO PHQ QUESTIONS 1-9: 2
2. FEELING DOWN, DEPRESSED OR HOPELESS: 1
SUM OF ALL RESPONSES TO PHQ QUESTIONS 1-9: 2
1. LITTLE INTEREST OR PLEASURE IN DOING THINGS: 1
SUM OF ALL RESPONSES TO PHQ9 QUESTIONS 1 & 2: 2

## 2022-07-14 ASSESSMENT — ENCOUNTER SYMPTOMS
SORE THROAT: 0
ABDOMINAL PAIN: 0
BACK PAIN: 1
SHORTNESS OF BREATH: 0
SINUS PAIN: 0
VOMITING: 0
NAUSEA: 0
DIARRHEA: 0
COUGH: 0

## 2022-07-14 NOTE — PATIENT INSTRUCTIONS
Patient Education        Back Pain: Care Instructions  Your Care Instructions     Back pain has many possible causes. It is often related to problems with muscles and ligaments of the back. It may also be related to problems with the nerves, discs, or bones of the back. Moving, lifting, standing, sitting, or sleeping in an awkward way can strain the back. Sometimes you don't notice theinjury until later. Arthritis is another common cause of back pain. Although it may hurt a lot, back pain usually improves on its own within several weeks. Most people recover in 12 weeks or less. Using good home treatment and being careful not to stress your back can help you feel bettersooner. Follow-up care is a key part of your treatment and safety. Be sure to make and go to all appointments, and call your doctor if you are having problems. It's also a good idea to know your test results and keep alist of the medicines you take. How can you care for yourself at home?  Sit or lie in positions that are most comfortable and reduce your pain. Try one of these positions when you lie down:  ? Lie on your back with your knees bent and supported by large pillows. ? Lie on the floor with your legs on the seat of a sofa or chair. ? Lie on your side with your knees and hips bent and a pillow between your legs. ? Lie on your stomach if it does not make pain worse.  Do not sit up in bed, and avoid soft couches and twisted positions. Bed rest can help relieve pain at first, but it delays healing. Avoid bed rest after the first day of back pain.  Change positions every 30 minutes. If you must sit for long periods of time, take breaks from sitting. Get up and walk around, or lie in a comfortable position.  Try using a heating pad on a low or medium setting for 15 to 20 minutes every 2 or 3 hours. Try a warm shower in place of one session with the heating pad.  You can also try an ice pack for 10 to 15 minutes every 2 to 3 hours. Put a thin cloth between the ice pack and your skin.  Take pain medicines exactly as directed. ? If the doctor gave you a prescription medicine for pain, take it as prescribed. ? If you are not taking a prescription pain medicine, ask your doctor if you can take an over-the-counter medicine.  Take short walks several times a day. You can start with 5 to 10 minutes, 3 or 4 times a day, and work up to longer walks. Walk on level surfaces and avoid hills and stairs until your back is better.  Return to work and other activities as soon as you can. Continued rest without activity is usually not good for your back.  To prevent future back pain, do exercises to stretch and strengthen your back and stomach. Learn how to use good posture, safe lifting techniques, and proper body mechanics. When should you call for help? Call your doctor now or seek immediate medical care if:     You have new or worsening numbness in your legs.      You have new or worsening weakness in your legs. (This could make it hard to stand up.)      You lose control of your bladder or bowels. Watch closely for changes in your health, and be sure to contact your doctor if:     You have a fever, lose weight, or don't feel well.      You do not get better as expected. Where can you learn more? Go to https://FrienditePlus.[employer]yan. org and sign in to your [email or web address] account. Enter D362 in the MessageBunker box to learn more about \"Back Pain: Care Instructions. \"     If you do not have an account, please click on the \"Sign Up Now\" link. Current as of: March 9, 2022               Content Version: 13.3  © 2006-2022 Healthwise, Incorporated. Care instructions adapted under license by [address] If you have questions about a medical condition or this instruction, always ask your healthcare professional. [place] [address] any warranty or liability for your use of this information.

## 2022-07-14 NOTE — PROGRESS NOTES
[id number] [address] [facility]  [address] [place] [address]  Dept: [phone]  Dept Fax: [phone]    Chabrel Hussein is a 63 y.o. female who presents today for her medicalconditions/complaints as noted below. Charbel Hussein is c/o of Extremity Weakness (Patient is having leg weakness. Patient also would like vitamin D level checked )      HPI:         63 y. Female presents for med check    Hx of cabg per Dr. Joseilto Sutherland several weeks ago. HAs been doing well post op however needs neurology clearance prior to starting cardiac rehab. PT feels she is losing strength everyday. Specifically having lower leg weakness. Did have bilat hip xrays showing some arthritis. Ct head negative. Ct lumbar negative. Unable to get MRI due to implanted stimulator from [address] [place] [address] urology. Type 2 dm, following with endo, has seen weight gain with insulin, a1c improving    psoritic arthritis, managed per Rheumatology with humira. Past Medical History:   Diagnosis Date    Frequent UTI     Osteopenia of multiple sites     Psoriatic arthritis ([address]     Type 2 diabetes mellitus without complication (HCC)     since pregnancy, 17 years ago        Current Outpatient Medications   Medication Sig Dispense Refill    metoprolol tartrate (LOPRESSOR) 25 MG tablet Take 1 tablet by mouth daily      aspirin 81 MG EC tablet Take 1 tablet by mouth daily      atorvastatin (LIPITOR) 80 MG tablet Take 80 mg by mouth daily      zolpidem (AMBIEN) 5 MG tablet Take 5 mg by mouth nightly as needed for Sleep.       Probiotic Product (PROBIOTIC PO) Take by mouth      MAGNESIUM PO Take by mouth      Adalimumab (HUMIRA PEN SC) Inject into the skin      insulin glargine (LANTUS SOLOSTAR) 100 UNIT/ML injection pen Lantus Solostar U-100 Insulin 100 unit/mL (3 mL) subcutaneous pen   ADMINISTER 25 UNITS UNDER THE SKIN TWICE DAILY AS DIRECTED      venlafaxine (EFFEXOR XR) 75 MG extended release capsule venlafaxine ER 75 mg capsule,extended release 24 hr      Turmeric (QC TUMERIC COMPLEX PO)       Cholecalciferol (VITAMIN D3 PO) 1 capsule      Continuous Blood Gluc  (FREESTYLE JUHI 14 DAY READER) BEATA FreeStyle Juhi 14 Day [place]   USE AS DIRECTED 4-6 TIMES DAILY      Insulin Pen Needle (PEN NEEDLES 3/16\") 31G X 5 MM MISC TO USE 2 X DAILY      metFORMIN (GLUCOPHAGE) 500 MG tablet       insulin lispro (HUMALOG) 100 UNIT/ML injection cartridge Inject into the skin        No current facility-administered medications for this visit. Allergies   Allergen Reactions    Dulaglutide      Other reaction(s): severe abdominal pain    Fenofibrate      Other reaction(s): rash    Fenofibrate Micronized     Liraglutide      Other reaction(s): stomach upset    Nitrofurantoin     Amoxicillin Rash    Sulfa Antibiotics Rash       Subjective:      Review of Systems   Constitutional: Negative for chills and fever. HENT: Negative for ear pain, sinus pain and sore throat. Respiratory: Negative for cough and shortness of breath. Cardiovascular: Negative for chest pain and palpitations. Gastrointestinal: Negative for abdominal pain, diarrhea, nausea and vomiting. Musculoskeletal: Positive for arthralgias and back pain. Neurological: Positive for weakness. Negative for dizziness and headaches. All other systems reviewed and are negative.      :Objective     Physical Exam  Vitals and nursing note reviewed. Constitutional:       General: She is not in acute distress. Appearance: Normal appearance. She is not toxic-appearing. Cardiovascular:      Rate and Rhythm: Normal rate. Pulmonary:      Effort: Pulmonary effort is normal.      Breath sounds: Normal breath sounds. Skin:     General: Skin is warm and dry. Neurological:      Mental Status: She is alert and oriented to person, place, and time. Motor: Weakness present.        /60 (Site: Left Upper Arm, Position: Sitting, process. No substantial narrowing of the osseous spinal canal or neural foramina. Sacral nerve stimulator extending into left S3 and S4 neural foramina. Nonobstructing right upper pole calculi, 1 to 2 mm. Moderate atherosclerotic disease abdominal aorta. IMPRESSION:     1. Negative lumbar spine CT. 2. Osteopenia. All CT scans at this facility use dose modulation, iterative reconstruction, and/or weight based dosing when appropriate to reduce radiation dose to as low as reasonably achievable. SNTDUWIBOIO:[id number]     Finalized by Jose G Story MD on 5/1/2022 7:01 PM        Assessment and Plan      1. Leg weakness, bilateral  -     Vitamin D 25 Hydroxy; Future  -     Vitamin B12; Future  -     Iron; Future  2. Low back pain without sciatica, unspecified back pain laterality, unspecified chronicity  -     Vitamin D 25 Hydroxy; Future  -     Vitamin B12; Future  -     Iron; Future       Reviewed recent testing  Additional labs  Follow up with neurology          No results found for this visit on 07/14/22. Return if symptoms worsen or fail to improve. No orders of the defined types were placed in this encounter. Patient given educational materials - see patient instructions. Discussed use, benefit, and side effects of prescribed medications. All patientquestions answered. Pt voiced understanding. Patient given educational materials - see patient instructions. Discussed use, benefit, and side effects of prescribed medications. All patientquestions answered. Pt voiced understanding. This note was transcribed using dictation with Dragon services. Efforts were made to correct any errors but some words may be misinterpreted.     Patient assumes risks associated with failure to complete recommended testing and treatments in a timely manner    Electronically signed by TYLER Young CNP on 7/14/2022at 9:44 PM

## 2022-07-25 ENCOUNTER — TELEPHONE (OUTPATIENT)
Dept: FAMILY MEDICINE CLINIC | Age: 62
End: 2022-07-25

## 2022-07-25 NOTE — TELEPHONE ENCOUNTER
[facility] asking for notes but this was not discussed at NP appt in April or July OV but sent recent ov regardless

## 2022-08-10 LAB
IRON: 49
VITAMIN B-12: 420
VITAMIN D 25-HYDROXY: 37.3
VITAMIN D2, 25 HYDROXY: NORMAL
VITAMIN D3,25 HYDROXY: NORMAL

## 2022-08-12 DIAGNOSIS — E61.1 IRON DEFICIENCY: Primary | ICD-10-CM

## 2022-08-12 DIAGNOSIS — M54.50 LOW BACK PAIN WITHOUT SCIATICA, UNSPECIFIED BACK PAIN LATERALITY, UNSPECIFIED CHRONICITY: ICD-10-CM

## 2022-08-12 DIAGNOSIS — R29.898 LEG WEAKNESS, BILATERAL: ICD-10-CM

## 2022-08-12 RX ORDER — FERROUS SULFATE 325(65) MG
325 TABLET ORAL 2 TIMES DAILY
Qty: 60 TABLET | Refills: 2 | Status: SHIPPED | OUTPATIENT
Start: 2022-08-12 | End: 2022-10-25

## 2022-08-25 ENCOUNTER — TELEPHONE (OUTPATIENT)
Dept: FAMILY MEDICINE CLINIC | Age: 62
End: 2022-08-25

## 2022-08-25 NOTE — TELEPHONE ENCOUNTER
Would advise to increase in diet with foods high in iron and also look into getting slow ease iron and only once daily from pharmacy and start there

## 2022-08-25 NOTE — TELEPHONE ENCOUNTER
Pt states that the iron pills are to strong and that it is causing abdominal pain. Pt would like to know what she should do. Please advise.

## 2022-09-15 DIAGNOSIS — G47.00 INSOMNIA, UNSPECIFIED TYPE: Primary | ICD-10-CM

## 2022-09-15 RX ORDER — ZOLPIDEM TARTRATE 5 MG/1
TABLET ORAL
Qty: 60 TABLET | Refills: 1 | Status: SHIPPED | OUTPATIENT
Start: 2022-09-15 | End: 2022-10-15

## 2022-10-06 ENCOUNTER — TELEPHONE (OUTPATIENT)
Dept: FAMILY MEDICINE CLINIC | Age: 62
End: 2022-10-06

## 2022-10-06 NOTE — TELEPHONE ENCOUNTER
----- Message from Socrates Flower sent at 10/6/2022  1:29 PM EDT -----  Subject: Referral Request    Reason for referral request? Patient needs a new endocrinologist. current   one left and notified patients to reach out to their PCP. Current one was   Quentin. Please call patient to see what options are available. Provider patient wants to be referred to(if known):     Provider Phone Number(if known):     Additional Information for Provider?   ---------------------------------------------------------------------------  --------------  [address]    [phone]; OK to leave message on voicemail  ---------------------------------------------------------------------------  --------------

## 2022-10-07 NOTE — TELEPHONE ENCOUNTER
I would have her call her insurance to see whom is covered and then I can give a better suggestion and place referral

## 2022-10-18 ENCOUNTER — TELEPHONE (OUTPATIENT)
Dept: PHARMACY | Age: 62
End: 2022-10-18

## 2022-10-18 ENCOUNTER — OFFICE VISIT (OUTPATIENT)
Dept: FAMILY MEDICINE CLINIC | Age: 62
End: 2022-10-18
Payer: COMMERCIAL

## 2022-10-18 VITALS
WEIGHT: 177.4 LBS | DIASTOLIC BLOOD PRESSURE: 60 MMHG | SYSTOLIC BLOOD PRESSURE: 110 MMHG | BODY MASS INDEX: 28.51 KG/M2 | TEMPERATURE: 98 F | HEART RATE: 87 BPM | OXYGEN SATURATION: 94 % | HEIGHT: 66 IN

## 2022-10-18 DIAGNOSIS — Z79.4 TYPE 2 DIABETES MELLITUS WITH DIABETIC NEUROPATHY, WITH LONG-TERM CURRENT USE OF INSULIN (HCC): Primary | ICD-10-CM

## 2022-10-18 DIAGNOSIS — Z23 FLU VACCINE NEED: ICD-10-CM

## 2022-10-18 DIAGNOSIS — E11.40 TYPE 2 DIABETES MELLITUS WITH DIABETIC NEUROPATHY, WITH LONG-TERM CURRENT USE OF INSULIN (HCC): Primary | ICD-10-CM

## 2022-10-18 PROBLEM — I73.9 PERIPHERAL VASCULAR DISEASE (HCC): Status: ACTIVE | Noted: 2022-09-21

## 2022-10-18 PROBLEM — M79.2 NEUROPATHIC PAIN: Status: ACTIVE | Noted: 2022-09-16

## 2022-10-18 PROBLEM — I25.10 CORONARY ARTERY DISEASE INVOLVING NATIVE CORONARY ARTERY OF NATIVE HEART WITHOUT ANGINA PECTORIS: Status: ACTIVE | Noted: 2022-05-26

## 2022-10-18 PROBLEM — K51.90 ULCERATIVE COLITIS (HCC): Status: ACTIVE | Noted: 2022-09-21

## 2022-10-18 LAB — HBA1C MFR BLD: 8.4 %

## 2022-10-18 PROCEDURE — G8482 FLU IMMUNIZE ORDER/ADMIN: HCPCS | Performed by: NURSE PRACTITIONER

## 2022-10-18 PROCEDURE — 1036F TOBACCO NON-USER: CPT | Performed by: NURSE PRACTITIONER

## 2022-10-18 PROCEDURE — 90674 CCIIV4 VAC NO PRSV 0.5 ML IM: CPT | Performed by: NURSE PRACTITIONER

## 2022-10-18 PROCEDURE — 90471 IMMUNIZATION ADMIN: CPT | Performed by: NURSE PRACTITIONER

## 2022-10-18 PROCEDURE — 99213 OFFICE O/P EST LOW 20 MIN: CPT | Performed by: NURSE PRACTITIONER

## 2022-10-18 PROCEDURE — G8419 CALC BMI OUT NRM PARAM NOF/U: HCPCS | Performed by: NURSE PRACTITIONER

## 2022-10-18 PROCEDURE — 3052F HG A1C>EQUAL 8.0%<EQUAL 9.0%: CPT | Performed by: NURSE PRACTITIONER

## 2022-10-18 PROCEDURE — 83036 HEMOGLOBIN GLYCOSYLATED A1C: CPT | Performed by: NURSE PRACTITIONER

## 2022-10-18 PROCEDURE — G8427 DOCREV CUR MEDS BY ELIG CLIN: HCPCS | Performed by: NURSE PRACTITIONER

## 2022-10-18 PROCEDURE — 3017F COLORECTAL CA SCREEN DOC REV: CPT | Performed by: NURSE PRACTITIONER

## 2022-10-18 PROCEDURE — 2022F DILAT RTA XM EVC RTNOPTHY: CPT | Performed by: NURSE PRACTITIONER

## 2022-10-18 PROCEDURE — G9899 SCRN MAM PERF RSLTS DOC: HCPCS | Performed by: NURSE PRACTITIONER

## 2022-10-18 ASSESSMENT — ENCOUNTER SYMPTOMS
CHEST TIGHTNESS: 0
COUGH: 0
NAUSEA: 0
VOMITING: 0
ABDOMINAL PAIN: 0
SHORTNESS OF BREATH: 0

## 2022-10-18 NOTE — PROGRESS NOTES
[address]  [place], [address]  [phone]      Ulysses Colon is a 63 y.o. female who presents today for her  medicalconditions/complaints as noted below. Ulysses Colon is c/o of Diabetes (Endo dr left practice and was advised to have pcp fill refills until she gets new one. Using [employer], [address] right now and just had lunch)  . HPI:    Diabetes  She presents for her follow-up diabetic visit. She has type 2 diabetes mellitus. Her disease course has been stable. Pertinent negatives for hypoglycemia include no dizziness or headaches. Pertinent negatives for diabetes include no chest pain and no fatigue. Her weight is stable. Her breakfast blood glucose range is generally  mg/dl. Her overall blood glucose range is 130-140 mg/dl. Last endo left office./ Bg better since open heart surgery this summer. Using CGM with no issues. Past Medical History:   Diagnosis Date    Frequent UTI     Osteopenia of multiple sites     Psoriatic arthritis ([address]     Type 2 diabetes mellitus without complication ([address]     since pregnancy, 17 years ago      Past Surgical History:   Procedure Laterality Date    COLOSTOMY      CORONARY ARTERY BYPASS GRAFT REDO  05/2022    x2    PACEMAKER PLACEMENT      in hip for pelvic floor     History reviewed. No pertinent family history.   Social History     Tobacco Use    Smoking status: Never    Smokeless tobacco: Never   Substance Use Topics    Alcohol use: Not on file      Current Outpatient Medications   Medication Sig Dispense Refill    metoprolol tartrate (LOPRESSOR) 25 MG tablet Take 1 tablet by mouth daily      aspirin 81 MG EC tablet Take 1 tablet by mouth daily      atorvastatin (LIPITOR) 80 MG tablet Take 80 mg by mouth daily      Probiotic Product (PROBIOTIC PO) Take by mouth      MAGNESIUM PO Take by mouth      insulin glargine (LANTUS SOLOSTAR) 100 UNIT/ML injection pen Lantus Solostar U-100 Insulin 100 unit/mL (3 mL) subcutaneous pen   ADMINISTER 25 UNITS UNDER THE SKIN TWICE DAILY AS DIRECTED      venlafaxine (EFFEXOR XR) 75 MG extended release capsule venlafaxine ER 75 mg capsule,extended release 24 hr      Turmeric (QC TUMERIC COMPLEX PO)       Cholecalciferol (VITAMIN D3 PO) 1 capsule      Continuous Blood Gluc  (FREESTYLE JUHI 14 DAY READER) BEATA FreeStyle Juhi 14 Day [place]   USE AS DIRECTED 4-6 TIMES DAILY      metFORMIN (GLUCOPHAGE) 500 MG tablet       insulin lispro (HUMALOG) 100 UNIT/ML injection cartridge Inject into the skin       ferrous sulfate (IRON 325) 325 (65 Fe) MG tablet Take 1 tablet by mouth in the morning and 1 tablet before bedtime. (Patient not taking: Reported on 10/18/2022) 60 tablet 2    Insulin Pen Needle (PEN NEEDLES 3/16\") 31G X 5 MM MISC TO USE 2 X DAILY       No current facility-administered medications for this visit. Allergies   Allergen Reactions    Cephalexin      Other reaction(s): full body rash      Dulaglutide      Other reaction(s): severe abdominal pain    Fenofibrate      Other reaction(s): rash    Fenofibrate Micronized     Liraglutide      Other reaction(s): stomach upset    Nitrofurantoin     Amoxicillin Rash    Sulfa Antibiotics Rash       Health Maintenance   Topic Date Due    COVID-19 Vaccine (4 - Booster for Moderna series) 03/06/2022    Cervical cancer screen  04/17/2025 (Originally 4/18/1981)    Depression Screen  07/14/2023    Lipids  08/11/2023    Breast cancer screen  10/05/2023    DTaP/Tdap/Td vaccine (2 - Td or Tdap) 01/09/2030    Colorectal Cancer Screen  04/14/2032    Flu vaccine  Completed    Shingles vaccine  Completed    Hepatitis C screen  Completed    HIV screen  Completed    Hepatitis A vaccine  Aged Out    Hib vaccine  Aged Out    Meningococcal (ACWY) vaccine  Aged Out    Pneumococcal 0-64 years Vaccine  Aged Out       Subjective:      Review of Systems   Constitutional:  Negative for appetite change, chills, diaphoresis, fatigue and fever.    Eyes: Negative for visual disturbance. Respiratory:  Negative for cough, chest tightness and shortness of breath. Cardiovascular:  Negative for chest pain, palpitations and leg swelling. Gastrointestinal:  Negative for abdominal pain, nausea and vomiting. Skin:  Negative for rash. Neurological:  Positive for numbness (feet/legs). Negative for dizziness and headaches. Hematological:  Negative for adenopathy. Psychiatric/Behavioral:  Negative for sleep disturbance. Objective:      Physical Exam  Vitals and nursing note reviewed. Constitutional:       General: She is not in acute distress. Appearance: Normal appearance. She is well-developed. She is not ill-appearing or diaphoretic. HENT:      Head: Normocephalic and atraumatic. Eyes:      Conjunctiva/sclera: Conjunctivae normal.   Cardiovascular:      Rate and Rhythm: Normal rate and regular rhythm. Pulses: Normal pulses. Heart sounds: Normal heart sounds. No murmur heard. Comments: No LE edema  Pulmonary:      Effort: Pulmonary effort is normal.      Breath sounds: Normal breath sounds. Musculoskeletal:      Cervical back: Neck supple. Skin:     General: Skin is warm and dry. Neurological:      General: No focal deficit present. Mental Status: She is alert and oriented to person, place, and time. Mental status is at baseline. Psychiatric:         Mood and Affect: Mood normal.         Behavior: Behavior normal.         Thought Content: Thought content normal.         Judgment: Judgment normal.       Assessment:       Diagnosis Orders   1. Type 2 diabetes mellitus with diabetic neuropathy, with long-term current use of insulin (HCC)  POCT glycosylated hemoglobin (Hb A1C)    [facility] Medication [facility] (Diabetes - Clinical Pharmacy) - [facility]      2.  Flu vaccine need  Influenza, FLUCELVAX, (age 10 mo+), IM, Preservative Free, 0.5 mL        Wt Readings from Last 3 Encounters:   10/18/22 177 lb 6.4 oz (80.5 kg) 07/14/22 171 lb (77.6 kg)   04/21/22 166 lb 12.8 oz (75.7 kg)     BP Readings from Last 3 Encounters:   10/18/22 110/60   07/14/22 106/60   04/21/22 120/64     Results for orders placed or performed in visit on 10/18/22   HIV Screen   Result Value Ref Range    HIV Ag/Ab nonreactive    POCT glycosylated hemoglobin (Hb A1C)   Result Value Ref Range    Hemoglobin A1C 8.4 %       Plan:      Return if symptoms worsen or fail to improve. Orders Placed This Encounter   Procedures    Influenza, FLUCELVAX, (age 10 mo+), IM, Preservative Free, 0.5 mL    HIV Screen     This order was created through External Result Entry    Hepatitis C Screen     This order was created through External Result Entry    [facility]) Medication Mgmt (Diabetes - Clinical Pharmacy) - [employer]     Referral Priority:   Routine     Referral Type:   Consult for Advice and Opinion     Referral Reason:   Specialty Services Required     Requested Specialty:   Pharmacist     Number of Visits Requested:   1     Expiration Date:   10/18/2024    POCT glycosylated hemoglobin (Hb A1C)     DM:  Refer to diab, med mgmt  Failed trulicity in the past and also was stopped on invokana  Continue statin and close glucose monitoring with freestyle  Flu vaccine given      Chemistry        Component Value Date/Time     02/09/2012 1604    K 3.7 02/09/2012 1604     02/09/2012 1604    CO2 25 02/09/2012 1604    BUN 9 02/09/2012 1604    CREATININE 0.53 02/09/2012 1604        Component Value Date/Time    CALCIUM 10.0 02/09/2012 1604        Continue with cardiac rehab and other specialists        Patient given educational materials - see patient instructions. Discussed use,benefit, and side effects of prescribed medications. All patient questions answered. Pt voiced understanding. Reviewed health maintenance. Instructed to continue currentmedications, diet and exercise.     Electronically signed by TYLER Thompson CNP, CNP on 10/18/2022 at 2:22 PM

## 2022-10-18 NOTE — PROGRESS NOTES
Visit Information    Have you changed or started any medications since your last visit including any over-the-counter medicines, vitamins, or herbal medicines? no   Have you stopped taking any of your medications? Is so, why? -  no  Are you having any side effects from any of your medications? - no    Have you seen any other physician or provider since your last visit?  no   Have you had any other diagnostic tests since your last visit?  no   Have you been seen in the emergency room and/or had an admission in a hospital since we last saw you?  no   Have you had your routine dental cleaning in the past 6 months?  no     Do you have an active MyChart account? If no, what is the barrier? Yes    Patient Care Team:  TYLER Saldivar CNP as PCP - General (Family Nurse Practitioner)  TYLER Saldivar CNP as PCP - [facility] Provider    Medical History Review  Past Medical, Family, and Social History reviewed and  contribute to the patient presenting condition    Health Maintenance   Topic Date Due    HIV screen  Never done    Diabetes screen  Never done    COVID-19 Vaccine (4 - Booster for Leoncio Pester series) 03/06/2022    Flu vaccine (1) 08/01/2022    Cervical cancer screen  04/17/2025 (Originally [birth year]1983)    Depression Screen  07/14/2023    Lipids  08/11/2023    Breast cancer screen  10/05/2023    DTaP/Tdap/Td vaccine (2 - Td or Tdap) 01/09/2030    Colorectal Cancer Screen  04/14/2032    Shingles vaccine  Completed    Hepatitis C screen  Completed    Hepatitis A vaccine  Aged Out    Hib vaccine  Aged Out    Meningococcal (ACWY) vaccine  Aged Out    Pneumococcal 0-64 years Vaccine  Aged Out       After obtaining consent, and per orders of Leonardo Staley CNP, injection of flu given in Left deltoid by Chuyita Lyles MA. Patient instructed to remain in clinic for 20 minutes afterwards, and to report any adverse reaction to me immediately.     Vaccine Information Sheet, \"Influenza - Inactivated\"  given to Jacqueline Kahn, or parent/legal guardian of  Jacqueline Kahn and verbalized understanding. Patient responses:    Have you ever had a reaction to a flu vaccine? No  Are you able to eat eggs without adverse effects? Yes  Do you have any current illness? No  Have you ever had Guillian [place] Syndrome? No    Flu vaccine given per order. Please see immunization tab.

## 2022-10-18 NOTE — TELEPHONE ENCOUNTER
Received new referral for Diabetes Medication Management from Salvador Stewart NP.   Called patient and scheduled patient for 10/25 at 11am .

## 2022-10-25 ENCOUNTER — HOSPITAL ENCOUNTER (OUTPATIENT)
Dept: PHARMACY | Age: 62
Setting detail: THERAPIES SERIES
Discharge: HOME | End: 2022-10-25
Payer: COMMERCIAL

## 2022-10-25 ENCOUNTER — ENROLLMENT (OUTPATIENT)
Age: 62
End: 2022-10-25

## 2022-10-25 PROCEDURE — 99212 OFFICE O/P EST SF 10 MIN: CPT

## 2022-10-25 NOTE — PROGRESS NOTES
Diabetic Medication Management Program  [address]    [address] [place] [address]. [place], [address] [facility]  Phone: [phone]  Fax: [phone]    NAME: Rachna Mock  MEDICAL RECORD NUMBER:  [id number]  AGE: 63 y.o. GENDER: female  : 1960  EPISODE DATE:  10/25/2022       Ms. Hoyos was referred to [facility] by Kyle Goldsmith CNP. Patient acknowledges working in consult agreement with clinical pharmacist and this provider. Goals per referral:   Fasting blood glucose: < 130  Peak postprandial glucose: < 180  A1C: < 7    SUBJECTIVE     Establishing care today. No breakfast until lunch  Lunch is biggest meal - because starving  [place], hotdog, maybe raw veggie, healthy chips  Dinner - around 5-6pm - cheese before dinner - rotissere chicken, tacos,  Worst time is 8-10pm before bed - 30 imnutes after ambien - usual night time snack celery and pB - ham lunch meat    Tolerated other meds before including Glimepiride, Glyburide, Invokana. Trialed Trulicity but was curled in a ball for a week. Unclear if patient was educated about importance of altering eating habits. Patient has frequent UTI. Has chronic script for Fluconazole. It is unclear if this is related to SGLT2 because it seems to have been a problem prior to therapy. Will potentially modify therapy if needed. codebender connection establishing today. Will send order for [facility] sensors with bluetooth capability for next month. Patient had significant GI procedure in the past and has large portion of intestine removed. Will review PK/PD with meds carefully taking this into consideration. Patient did follow with Endo but was not happy with management. Aggressive sliding scale has made patient have hypoglycemic symptoms many times. A1c was only goal, not focused on the daily trends. Recent cardiac procedure. Patient completing cardiac rehab three times weekly.     OBJECTIVE     PMHx: Past Medical History:   Diagnosis Date    Frequent UTI     Osteopenia of multiple sites     Psoriatic arthritis ([facility] [address]     Type 2 diabetes mellitus without complication ([facility] [address]     since pregnancy, 17 years ago     Social History:    Social History     Tobacco Use    Smoking status: Never    Smokeless tobacco: Never   Substance Use Topics    Alcohol use: Not on file     Pertinent Labs:    Recent A1c:     Lab Results   Component Value Date    LABA1C 8.4 10/18/2022     No results found for: CHOL, TRIG, HDL, LDLCALC  Lab Results   Component Value Date    CREATININE 0.53 02/09/2012    BUN 9 02/09/2012     02/09/2012    K 3.7 02/09/2012     02/09/2012    CO2 25 02/09/2012     No results found for: ALT    Weight:  Wt Readings from Last 3 Encounters:   11/09/22 180 lb 6.4 oz (81.8 kg)   10/18/22 177 lb 6.4 oz (80.5 kg)   07/14/22 171 lb (77.6 kg)     Current medications:  Prior to Admission medications    Medication Sig Start Date End Date Taking? Authorizing Provider   aspirin 81 MG EC tablet Take 1 tablet by mouth daily 5/26/22  Yes Historical Provider, MD   atorvastatin (LIPITOR) 80 MG tablet Take 80 mg by mouth daily   Yes Historical Provider, MD   insulin glargine (LANTUS SOLOSTAR) 100 UNIT/ML injection pen Inject 40 Units into the skin 2 times daily   Yes Historical Provider, MD   Cholecalciferol (VITAMIN D3 PO) 1 capsule   Yes Historical Provider, MD   Continuous Blood Gluc  (FREESTYLE JUHI 14 DAY READER) BEATA FreeStyle Juhi 14 Day [place]   USE AS DIRECTED 4-6 TIMES DAILY   Yes Historical Provider, MD   metFORMIN (GLUCOPHAGE) 500 MG tablet Take one tablet by mouth twice daily.  11/2/22   TYLER Escoto CNP   Continuous Blood Gluc Sensor (FREESTYLE JUHI 2 SENSOR) MISC Change sensor every 14 days for continuous glucose management 11/2/22   TYLER Escoto CNP   insulin lispro, 1 Unit Dial, (HUMALOG KWIKPEN) 100 UNIT/ML SOPN Inject 20 units plus sliding scale prior to meals three times daily (Up to 90 units daily) 11/2/22   Melissa Ramesh, APRN - CNP   metoprolol tartrate (LOPRESSOR) 25 MG tablet Take 1 tablet by mouth daily 5/29/22   Historical Provider, MD   Probiotic Product (PROBIOTIC PO) Take by mouth    Historical Provider, MD   MAGNESIUM PO Take by mouth    Historical Provider, MD   venlafaxine (EFFEXOR XR) 75 MG extended release capsule venlafaxine ER 75 mg capsule,extended release 24 hr    Historical Provider, MD   Turmeric (QC TUMERIC COMPLEX PO)     Historical Provider, MD   Insulin Pen Needle (PEN NEEDLES 3/16\") 31G X 5 MM MISC TO USE 2 X DAILY    Historical Provider, MD     Immunizations:   Most Recent Immunizations   Administered Date(s) Administered    COVID-19, MODERNA BLUE border, Primary or Immunocompromised, (age 12y+), IM, 100 mcg/0.5mL 11/06/2021    Influenza A (B4P8-12) Vaccine PF IM 10/25/2009    Influenza Virus Vaccine 10/17/2017    Influenza, FLUCELVAX, (age 10 mo+), MDCK, PF, 0.5mL 10/18/2022    Pneumococcal Conjugate 13-valent ([id number]) 08/09/2017    Pneumococcal Polysaccharide ([id number]) 11/09/2018    Tdap (Boostrix, Adacel) 01/09/2020    Zoster Live (Zostavax) 09/22/2014    Zoster Recombinant (Shingrix) 05/03/2021       ASSESSMENT     Diabetes Self Management Initial Assessment    DIABETES HISTORY  What type of diabetes do you have? Type 2 but was told her numbers made her borderline Type 1  Do you have any relatives with diabetes? Unsure  Have you received any type of diabetes education in the past? Yes - many times  Are you aware of any potential long-term effects of diabetes? Yes -      Females:  Did you have diabetes during pregnancy? Yes - daughter is now 16 - has been diabetic since pregnancy  Are you currently pregnant? No    DIABETES MANAGEMENT  Do you currently check your blood sugar levels at home?  Yes - CGM  What is your target A1c? < 7    Immunizations:   Most Recent Immunizations   Administered Date(s) Administered    COVID-19, [address] [facility] border, Primary or Immunocompromised, (age 12y+), IM, 100 mcg/0.5mL 11/06/2021    Influenza A ([id number]) Vaccine PF IM 10/25/2009    Influenza Virus Vaccine 10/17/2017    Influenza, FLUCELVAX, (age 10 mo+), MDCK, PF, 0.5mL 10/18/2022    Pneumococcal Conjugate 13-valent ([id number]) 08/09/2017    Pneumococcal Polysaccharide ([id number]) 11/09/2018    Tdap (Boostrix, Adacel) 01/09/2020    Zoster Live (Zostavax) 09/22/2014    Zoster Recombinant (Shingrix) 05/03/2021     Statin: Yes    Appropriate?: Yes  Changes made:  None     For reference, all diabetics should be on statin unless CI (active liver dx, ALT 3x normal, pregnant, breastfeeding, allergy to statin, age < 36 w/o ASCVD risk factors, LDL < 70    ACE/ARB: No:   Appropriate?: No: Need to review further to assess  Changes made: Will address at future visit. For reference all diabetics should be on ACE/ARB unless normal BP and normal urine albumin to creatinine ratio (< 30 mg/g) and normal GFR    Aspirin: Yes  Appropriate?: Yes  Changes made:  None     For reference:   -Primary prevention indications: DM + Age > 48 + 1 other risk factor (family hx of ASCVD, HTN, dyslipidemia, smoking, or albuminuria). Not if at increased risk of bleeding.  -Secondary prevention indications: DM + hx of ASCVD (if allergy use ASA)           PLAN     Provided initial education on diabetic disease state, medications, monitoring blood glucose, diet, and exercise. Initial diabetic education materials given to patient including the following:   * Blood Glucose Log  *Living Well With Diabetes              * Using the Plate Method for a Balanced Meal Plan              * Things to Know About Hypoglycemia              *Smart Snacks              *A1c and Average Glucose Chart              *Alcohol and Diabetes              *Preventative Care Resources: Eye Care, Podiatrists, Dentists, Daniel Paula    Patient to set up faby for [place] and accept invitation from clinic to link data for review.      Physician Follow-up:  As scheduled    Medication Management Follow-up:   Diabetes Service         For Pharmacy Admin Tracking Only    Intervention Detail: New Rx: 1, reason: Needs Additional Therapy  Total # of Interventions Recommended: 1  Total # of Interventions Accepted: 1  Time Spent (min): 60

## 2022-11-02 RX ORDER — INSULIN LISPRO 100 [IU]/ML
INJECTION, SOLUTION INTRAVENOUS; SUBCUTANEOUS
Qty: 10 ADJUSTABLE DOSE PRE-FILLED PEN SYRINGE | Refills: 5 | Status: SHIPPED | OUTPATIENT
Start: 2022-11-02

## 2022-11-02 RX ORDER — FLASH GLUCOSE SENSOR
KIT MISCELLANEOUS
Qty: 2 EACH | Refills: 5 | Status: SHIPPED | OUTPATIENT
Start: 2022-11-02

## 2022-11-02 NOTE — TELEPHONE ENCOUNTER
Patient called requesting refills on Metformin and Humalog. We are also transitioning to [facility] from [facility] 14 day. Sent to [facility] in [place].

## 2022-11-07 ENCOUNTER — TELEPHONE (OUTPATIENT)
Dept: PHARMACY | Age: 62
End: 2022-11-07

## 2022-11-09 ENCOUNTER — HOSPITAL ENCOUNTER (OUTPATIENT)
Dept: PHARMACY | Age: 62
Setting detail: THERAPIES SERIES
Discharge: HOME | End: 2022-11-09
Payer: COMMERCIAL

## 2022-11-09 VITALS
DIASTOLIC BLOOD PRESSURE: 70 MMHG | WEIGHT: 180.4 LBS | TEMPERATURE: 98 F | SYSTOLIC BLOOD PRESSURE: 105 MMHG | OXYGEN SATURATION: 97 % | HEART RATE: 78 BPM | BODY MASS INDEX: 29.12 KG/M2

## 2022-11-09 PROCEDURE — 99212 OFFICE O/P EST SF 10 MIN: CPT

## 2022-11-09 NOTE — PROGRESS NOTES
Diabetic Medication Management Program  [facility] CHILDREN'S [place] - INPATIENT  [address]. [facility], [address]  Phone: [phone]  Fax: [phone]    NAME: Liset Smith  MEDICAL RECORD NUMBER:  [id number]  AGE: 63 y.o. GENDER: female  : 1960  EPISODE DATE:  2022     Ms. Hoyos was referred to [facility] Medication Management Services by Oseas Mckeon CNP. Patient acknowledges working in consult agreement with clinical pharmacist and this provider. Goals per referral:   Fasting blood glucose: < 130  Peak postprandial glucose: < 180  A1C: < 7    SUBJECTIVE     Ms. Hu Fitch is a 63 y.o. female here for the Diabetes Service for self-management education, medication review including over the counter medications and herbal products, overall wellbeing assessment, transition of care and any needed adjustments with updates and recommendations communicated to the referring physician. Patient Findings:   Medication changes:  no  Diet changes:   Trying to eat keto bread and other carb smart choices but not actively counting carbs  Activity changes:   Patient just finished cardiac rehab which was three days weekly. She will be planning to figure out what to do moving forward with regard to joining another fitness/PT program  Emergency Room Visit or Hospitalization:  no  Acute Illness/new problems:   She is concerned she might have an infection but not sure. No fever. Symptoms of hypoglycemia:  yes -  alarm sounded with CGM while sleeping - unclear if symptoms were noticeable.   Symptoms of hyperglycemia:  no - none  Medication adverse reactions:  none    OBJECTIVE     PMHx:    Past Medical History:   Diagnosis Date    Frequent UTI     Osteopenia of multiple sites     Psoriatic arthritis ([facility] [address]     Type 2 diabetes mellitus without complication ([facility] [address]     since pregnancy, 17 years ago     Social History:    Social History     Tobacco Use    Smoking status: Never    Smokeless tobacco: Never   Substance Use Topics    Alcohol use: Not on file     Pertinent Labs:    Lab Results   Component Value Date    LABA1C 8.4 10/18/2022     No results found for: CHOL, TRIG, HDL, LDLCALC  Lab Results   Component Value Date    CREATININE 0.53 02/09/2012    BUN 9 02/09/2012     02/09/2012    K 3.7 02/09/2012     02/09/2012    CO2 25 02/09/2012     No results found for: ALT    Weight:  Wt Readings from Last 3 Encounters:   10/18/22 177 lb 6.4 oz (80.5 kg)   07/14/22 171 lb (77.6 kg)   04/21/22 166 lb 12.8 oz (75.7 kg)     Blood Pressure:  BP Readings from Last 3 Encounters:   10/18/22 110/60   07/14/22 106/60   04/21/22 120/64     Current medications:    Current Outpatient Medications:     metFORMIN (GLUCOPHAGE) 500 MG tablet, Take one tablet by mouth twice daily. , Disp: 60 tablet, Rfl: 2    Continuous Blood Gluc Sensor (FREESTYLE JUHI 2 SENSOR) MISC, Change sensor every 14 days for continuous glucose management, Disp: 2 each, Rfl: 5    insulin lispro, 1 Unit Dial, (HUMALOG KWIKPEN) 100 UNIT/ML SOPN, Inject 20 units plus sliding scale prior to meals three times daily (Up to 90 units daily), Disp: 10 Adjustable Dose Pre-filled Pen Syringe, Rfl: 5    metoprolol tartrate (LOPRESSOR) 25 MG tablet, Take 1 tablet by mouth daily, Disp: , Rfl:     aspirin 81 MG EC tablet, Take 1 tablet by mouth daily, Disp: , Rfl:     atorvastatin (LIPITOR) 80 MG tablet, Take 80 mg by mouth daily, Disp: , Rfl:     Probiotic Product (PROBIOTIC PO), Take by mouth, Disp: , Rfl:     MAGNESIUM PO, Take by mouth, Disp: , Rfl:     insulin glargine (LANTUS SOLOSTAR) 100 UNIT/ML injection pen, Inject 40 Units into the skin 2 times daily, Disp: , Rfl:     venlafaxine (EFFEXOR XR) 75 MG extended release capsule, venlafaxine ER 75 mg capsule,extended release 24 hr, Disp: , Rfl:     Turmeric (QC TUMERIC COMPLEX PO), , Disp: , Rfl:     Cholecalciferol (VITAMIN D3 PO), 1 capsule, Disp: , Rfl:     Continuous Blood Gluc  (FREESTYLE JUHI 14 DAY READER) BEATA, FreeStyle [place] 14 Day [place]  USE AS DIRECTED 4-6 TIMES DAILY, Disp: , Rfl:     Insulin Pen Needle (PEN NEEDLES 3/16\") 31G X 5 MM MISC, TO USE 2 X DAILY, Disp: , Rfl:     Immunizations:   Most Recent Immunizations   Administered Date(s) Administered    COVID-19, MODERNA BLUE border, Primary or Immunocompromised, (age 12y+), IM, 100 mcg/0.5mL 11/06/2021    Influenza A (O2E2-35) Vaccine PF IM 10/25/2009    Influenza Virus Vaccine 10/17/2017    Influenza, FLUCELVAX, (age 10 mo+), MDCK, PF, 0.5mL 10/18/2022    Pneumococcal Conjugate 13-valent (Eyqldmv38) 08/09/2017    Pneumococcal Polysaccharide (Hmfppmexc02) 11/09/2018    Tdap (Boostrix, Adacel) 01/09/2020    Zoster Live (Zostavax) 09/22/2014    Zoster Recombinant (Shingrix) 05/03/2021     Home Blood Glucose Results: See below - Has successfully transitioned to [facility] - potentially interested in [place] but states she will stick with 2 for now. Blood glucose trends noted:  Not enough recorded data to get accurate estimate of goals achieved. Some gaps are due to the patient scanning with her previous reader instead of her phone. She has now transitioned to using the phone only. ASSESSMENT     Recent A1c: 8.4     Lab Results   Component Value Date    LABA1C 8.4 10/18/2022      Eating patterns: PB celery, crackers, almonds, eggs, not big meat eater,     Exercise patterns: Just got done with cardiac rehab. Would like to join [employer] or something like that. No longer with the [facility]. Blood Glucose Testing: Patient is currently using CGM    Current Diabetic Medications: Metformin BID, Lantus 40U twice daily, Humalog per sliding scale with base dose of 18U before breakfast and lunch and 22U before dinner. It seems this scale is causing patient to drop low rather frequently. We will look to empirically reduce the dose and assess trends and will look to order C-Peptide at her next visit.      Diabetic Regimen/Compliance: Has been taking humalog consistenly with evening meal but does not take in the morning if she only has a Glucerna. She also did not take at lunch time when she only ate a snack and not intended meal.  Took Metformin late last night and the night before which may help to explain delayed fall in BG after 10pm.     Other Medication Compliance: Compliant. PLAN   Diabetic Action Plan is shown below. Patient and provider worked together to create goals which patient will work toward prior to the next appointment.         Physician Follow-up:  As scheduled    Medication Management Follow-up:   Diabetes Service  - 2 weeks in clinic - Will get C-Peptide (verified ok per PCP)    Electronically signed by David Garcia [facility] on 2022 at 10:16 AM    For Pharmacy Admin Tracking Only    Intervention Detail: Adherence Monitorin and Dose Adjustment: 1, reason: Therapy Optimization  Total # of Interventions Recommended: 2  Total # of Interventions Accepted: 2  Time Spent (min): 60

## 2022-11-15 ENCOUNTER — APPOINTMENT (OUTPATIENT)
Dept: PHARMACY | Age: 62
End: 2022-11-15
Payer: COMMERCIAL

## 2022-11-21 RX ORDER — VENLAFAXINE HYDROCHLORIDE 75 MG/1
CAPSULE, EXTENDED RELEASE ORAL
Qty: 90 CAPSULE | Refills: 1 | Status: SHIPPED | OUTPATIENT
Start: 2022-11-21

## 2022-11-21 RX ORDER — INSULIN GLARGINE 100 [IU]/ML
40-50 INJECTION, SOLUTION SUBCUTANEOUS 2 TIMES DAILY
Qty: 10 ADJUSTABLE DOSE PRE-FILLED PEN SYRINGE | Refills: 2 | Status: SHIPPED | OUTPATIENT
Start: 2022-11-21

## 2022-11-21 NOTE — TELEPHONE ENCOUNTER
Lantus refill requested by patient to be sent to [address] [facility]    Intervention Detail: New Rx: 1, reason: Needs Additional Therapy  Total # of Interventions Recommended: 1  Total # of Interventions Accepted: 1  Time Spent (min): 10

## 2022-11-22 ENCOUNTER — HOSPITAL ENCOUNTER (OUTPATIENT)
Age: 62
Setting detail: SPECIMEN
Discharge: HOME | End: 2022-11-22
Payer: COMMERCIAL

## 2022-11-22 ENCOUNTER — HOSPITAL ENCOUNTER (OUTPATIENT)
Dept: PHARMACY | Age: 62
Setting detail: THERAPIES SERIES
Discharge: HOME | End: 2022-11-22
Payer: COMMERCIAL

## 2022-11-22 ENCOUNTER — TELEPHONE (OUTPATIENT)
Dept: FAMILY MEDICINE CLINIC | Age: 62
End: 2022-11-22

## 2022-11-22 VITALS
DIASTOLIC BLOOD PRESSURE: 72 MMHG | WEIGHT: 179.8 LBS | HEART RATE: 90 BPM | SYSTOLIC BLOOD PRESSURE: 124 MMHG | TEMPERATURE: 96 F | BODY MASS INDEX: 29.02 KG/M2

## 2022-11-22 DIAGNOSIS — Z79.4 TYPE 2 DIABETES MELLITUS WITH DIABETIC NEUROPATHY, WITH LONG-TERM CURRENT USE OF INSULIN (HCC): Primary | ICD-10-CM

## 2022-11-22 DIAGNOSIS — E11.40 TYPE 2 DIABETES MELLITUS WITH DIABETIC NEUROPATHY, WITH LONG-TERM CURRENT USE OF INSULIN (HCC): Primary | ICD-10-CM

## 2022-11-22 DIAGNOSIS — E11.40 TYPE 2 DIABETES MELLITUS WITH DIABETIC NEUROPATHY, WITH LONG-TERM CURRENT USE OF INSULIN (HCC): ICD-10-CM

## 2022-11-22 DIAGNOSIS — Z79.4 TYPE 2 DIABETES MELLITUS WITH DIABETIC NEUROPATHY, WITH LONG-TERM CURRENT USE OF INSULIN (HCC): ICD-10-CM

## 2022-11-22 PROCEDURE — 36415 COLL VENOUS BLD VENIPUNCTURE: CPT

## 2022-11-22 PROCEDURE — 84681 ASSAY OF C-PEPTIDE: CPT

## 2022-11-22 PROCEDURE — 99212 OFFICE O/P EST SF 10 MIN: CPT

## 2022-11-22 RX ORDER — ESTRADIOL 0.1 MG/G
CREAM VAGINAL
COMMUNITY
Start: 2022-10-28

## 2022-11-22 RX ORDER — LANCETS 33 GAUGE
EACH MISCELLANEOUS
COMMUNITY
Start: 2022-11-18

## 2022-11-22 NOTE — PROGRESS NOTES
Diabetic Medication Management Program  [address]. [place], [address] [place] [address] Drive  Phone: [phone]  Fax: [phone]    NAME: Toya Duque  MEDICAL RECORD NUMBER:  [id number]  AGE: 63 y.o. GENDER: female  : 1960  EPISODE DATE:  2022     Ms. Hoyos was referred to [facility] by Nadeen Payton CNP. Patient acknowledges working in consult agreement with clinical pharmacist and this provider. Goals per referral:   Fasting blood glucose: < 130  Peak postprandial glucose: < 180  A1C: < 7    SUBJECTIVE     Ms. Rasheed Donald is a 63 y.o. female here for the Diabetes Service for self-management education, medication review including over the counter medications and herbal products, overall wellbeing assessment, transition of care and any needed adjustments with updates and recommendations communicated to the referring physician.        Patient Findings:   Medication changes:  no   Diet changes  no  Activity changes  no  Emergency Room Visit or Hospitalization  no  Acute Illness/new problems  no  Symptoms of hypoglycemia  no - none  Symptoms of hyperglycemia  no - none  Medication adverse reactions  none      OBJECTIVE     PMHx:    Past Medical History:   Diagnosis Date    Frequent UTI     Osteopenia of multiple sites     Psoriatic arthritis ([facility] [address]     Type 2 diabetes mellitus without complication ([facility] [address]     since pregnancy, 17 years ago     Social History:    Social History     Tobacco Use    Smoking status: Never    Smokeless tobacco: Never   Substance Use Topics    Alcohol use: Not on file     Pertinent Labs:    Lab Results   Component Value Date    LABA1C 8.4 10/18/2022     No results found for: CHOL, TRIG, HDL, LDLCALC  Lab Results   Component Value Date    CREATININE 0.53 2012    BUN 9 2012     2012    K 3.7 2012     2012    CO2 25 2012     No results found for: ALT    Weight:  Wt Readings from Last 3 Encounters:   11/22/22 179 lb 12.8 oz (81.6 kg)   11/09/22 180 lb 6.4 oz (81.8 kg)   10/18/22 177 lb 6.4 oz (80.5 kg)     Blood Pressure:  BP Readings from Last 3 Encounters:   11/22/22 124/72   11/09/22 105/70   10/18/22 110/60     Current medications:    Current Outpatient Medications:     venlafaxine (EFFEXOR XR) 75 MG extended release capsule, venlafaxine ER 75 mg capsule,extended release 24 hr, Disp: 90 capsule, Rfl: 1    insulin glargine (LANTUS SOLOSTAR) 100 UNIT/ML injection pen, Inject 40-50 Units into the skin 2 times daily, Disp: 10 Adjustable Dose Pre-filled Pen Syringe, Rfl: 2    metFORMIN (GLUCOPHAGE) 500 MG tablet, Take one tablet by mouth twice daily. , Disp: 60 tablet, Rfl: 2    Continuous Blood Gluc Sensor (FREESTYLE JUHI 2 SENSOR) MISC, Change sensor every 14 days for continuous glucose management, Disp: 2 each, Rfl: 5    insulin lispro, 1 Unit Dial, (HUMALOG KWIKPEN) 100 UNIT/ML SOPN, Inject 20 units plus sliding scale prior to meals three times daily (Up to 90 units daily), Disp: 10 Adjustable Dose Pre-filled Pen Syringe, Rfl: 5    metoprolol tartrate (LOPRESSOR) 25 MG tablet, Take 1 tablet by mouth daily, Disp: , Rfl:     aspirin 81 MG EC tablet, Take 1 tablet by mouth daily, Disp: , Rfl:     atorvastatin (LIPITOR) 80 MG tablet, Take 80 mg by mouth daily, Disp: , Rfl:     Probiotic Product (PROBIOTIC PO), Take by mouth, Disp: , Rfl:     MAGNESIUM PO, Take by mouth, Disp: , Rfl:     Turmeric (QC TUMERIC COMPLEX PO), , Disp: , Rfl:     Cholecalciferol (VITAMIN D3 PO), 1 capsule, Disp: , Rfl:     Continuous Blood Gluc  (FREESTYLE JUHI 14 DAY READER) BEATA, FreeStyle Juhi 14 Day [place]  USE AS DIRECTED 4-6 TIMES DAILY, Disp: , Rfl:     Insulin Pen Needle (PEN NEEDLES 3/16\") 31G X 5 MM MISC, TO USE 2 X DAILY, Disp: , Rfl:     Immunizations:   Most Recent Immunizations   Administered Date(s) Administered    COVID-19, MODERNA BLUE border, Primary or Immunocompromised, (age 12y+), IM, 100 mcg/0.5mL 11/06/2021    Influenza A (Z3I5-73) Vaccine PF IM 10/25/2009    Influenza Virus Vaccine 10/17/2017    Influenza, FLUCELVAX, (age 10 mo+), MDCK, PF, 0.5mL 10/18/2022    Pneumococcal Conjugate 13-valent (Zesuhrr05) 08/09/2017    Pneumococcal Polysaccharide (Eqrvemydu33) 11/09/2018    Tdap (Boostrix, Adacel) 01/09/2020    Zoster Live (Zostavax) 09/22/2014    Zoster Recombinant (Shingrix) 05/03/2021     Home Blood Glucose Results: See below            Blood glucose trends noted:  see below      ASSESSMENT     Recent A1c: Due 1/18/22    Lab Results   Component Value Date    LABA1C 8.4 10/18/2022      Eating patterns: Trying to reduce carb intake but not specifically carb counting. Exercise patterns: No current intentional exercise     Blood Glucose Testing: Patient is currently using CGM    Current Diabetic Medications:  Metformin, Lantus, Humalog    Diabetic Regimen/Compliance: Late metformin - Late am Lantus on the weekend     Other Medication Compliance: Compliant      PLAN     Diabetic Action Plan is shown below. Patient and provider worked together to create goals which patient will work toward prior to the next appointment.       Physician Follow-up:  As scheduled    Medication Management Follow-up:   Diabetes Service  1 week - strict dietary log review to help make more accurate sliding scale    Electronically signed by Patricia Mai [facility] on 11/22/2022 at 1:54 PM    For Pharmacy Admin Tracking Only    Intervention Detail: Dose Adjustment: 1, reason: Therapy Optimization and Lab(s) Ordered  Total # of Interventions Recommended: 2  Total # of Interventions Accepted: 2  Time Spent (min): 60

## 2022-11-22 NOTE — TELEPHONE ENCOUNTER
Kayode called in regards to the patients venlafaxine that was sent over. She states there is no sig on it and wants clarification on how the patient should take this.

## 2022-11-23 LAB — C-PEPTIDE: 1 NG/ML (ref 1.1–4.4)

## 2022-11-30 ENCOUNTER — HOSPITAL ENCOUNTER (OUTPATIENT)
Dept: PHARMACY | Age: 62
Setting detail: THERAPIES SERIES
Discharge: HOME | End: 2022-11-30
Payer: COMMERCIAL

## 2022-11-30 ENCOUNTER — HOSPITAL ENCOUNTER (OUTPATIENT)
Age: 62
Discharge: HOME | End: 2022-11-30
Payer: COMMERCIAL

## 2022-11-30 VITALS
WEIGHT: 178.8 LBS | SYSTOLIC BLOOD PRESSURE: 125 MMHG | DIASTOLIC BLOOD PRESSURE: 73 MMHG | OXYGEN SATURATION: 98 % | BODY MASS INDEX: 28.86 KG/M2 | TEMPERATURE: 97.3 F | HEART RATE: 94 BPM

## 2022-11-30 PROCEDURE — 36415 COLL VENOUS BLD VENIPUNCTURE: CPT

## 2022-11-30 PROCEDURE — 82397 CHEMILUMINESCENT ASSAY: CPT

## 2022-11-30 PROCEDURE — 99212 OFFICE O/P EST SF 10 MIN: CPT

## 2022-11-30 PROCEDURE — 80145 DRUG ASSAY ADALIMUMAB: CPT

## 2022-11-30 NOTE — PROGRESS NOTES
Diabetic Medication Management Program  [facility] CHILDREN'S [place] - INPATIENT  [address] [facility]. [place], [address] [facility]  Phone: [phone]  Fax: [phone]    NAME: Gaurav Brock  MEDICAL RECORD NUMBER:  [id number]  AGE: 63 y.o. GENDER: female  : 1960  EPISODE DATE:  2022     Ms. Hoyos was referred to [facility] by Jhoana St CNP. Patient acknowledges working in consult agreement with clinical pharmacist and this provider. Goals per referral:   Fasting blood glucose: < 130  Peak postprandial glucose: < 180  A1C: < 7    SUBJECTIVE     Ms. Ángel Staton is a 63 y.o. female here for the Diabetes Service for self-management education, medication review including over the counter medications and herbal products, overall wellbeing assessment, transition of care and any needed adjustments with updates and recommendations communicated to the referring physician.        Patient Findings:   Medication changes  no  Diet changes  no  Activity changes  no  Emergency Room Visit or Hospitalization  no  Acute Illness/new problems  no  Symptoms of hypoglycemia  yes - dizziness  Symptoms of hyperglycemia  yes - fatigue  Medication adverse reactions  none     OBJECTIVE   PMHx:    Past Medical History:   Diagnosis Date    Frequent UTI     Osteopenia of multiple sites     Psoriatic arthritis ([facility] [address]     Type 2 diabetes mellitus without complication ([facility] [address]     since pregnancy, 17 years ago     Social History:    Social History     Tobacco Use    Smoking status: Never    Smokeless tobacco: Never   Substance Use Topics    Alcohol use: Not on file     Pertinent Labs:    Lab Results   Component Value Date    LABA1C 8.4 10/18/2022     No results found for: CHOL, TRIG, HDL, LDLCALC  Lab Results   Component Value Date    CREATININE 0.53 2012    BUN 9 2012     2012    K 3.7 2012     2012    CO2 25 2012     No results found for: ALT    Weight:  Wt Readings from Last 3 Encounters:   11/30/22 178 lb 12.8 oz (81.1 kg)   11/22/22 179 lb 12.8 oz (81.6 kg)   11/09/22 180 lb 6.4 oz (81.8 kg)     Blood Pressure:  BP Readings from Last 3 Encounters:   11/30/22 125/73   11/22/22 124/72   11/09/22 105/70     Current medications:    Current Outpatient Medications:     estradiol (ESTRACE) 0.1 MG/GM vaginal cream, APPLY A FINGERTIP DOSE VAGINALLY EVERY OTHER DAY AT NIGHT, Disp: , Rfl:     Lancets 33G MISC, Ue 4 times daily to check your blood glucose, Disp: , Rfl:     venlafaxine (EFFEXOR XR) 75 MG extended release capsule, venlafaxine ER 75 mg capsule,extended release 24 hr, Disp: 90 capsule, Rfl: 1    insulin glargine (LANTUS SOLOSTAR) 100 UNIT/ML injection pen, Inject 40-50 Units into the skin 2 times daily, Disp: 10 Adjustable Dose Pre-filled Pen Syringe, Rfl: 2    metFORMIN (GLUCOPHAGE) 500 MG tablet, Take one tablet by mouth twice daily. , Disp: 60 tablet, Rfl: 2    Continuous Blood Gluc Sensor (FREESTYLE JUHI 2 SENSOR) MISC, Change sensor every 14 days for continuous glucose management, Disp: 2 each, Rfl: 5    insulin lispro, 1 Unit Dial, (HUMALOG KWIKPEN) 100 UNIT/ML SOPN, Inject 20 units plus sliding scale prior to meals three times daily (Up to 90 units daily), Disp: 10 Adjustable Dose Pre-filled Pen Syringe, Rfl: 5    metoprolol tartrate (LOPRESSOR) 25 MG tablet, Take 1 tablet by mouth daily, Disp: , Rfl:     aspirin 81 MG EC tablet, Take 1 tablet by mouth daily, Disp: , Rfl:     atorvastatin (LIPITOR) 80 MG tablet, Take 80 mg by mouth daily, Disp: , Rfl:     Probiotic Product (PROBIOTIC PO), Take by mouth, Disp: , Rfl:     MAGNESIUM PO, Take by mouth, Disp: , Rfl:     Turmeric (QC TUMERIC COMPLEX PO), , Disp: , Rfl:     Cholecalciferol (VITAMIN D3 PO), 1 capsule, Disp: , Rfl:     Insulin Pen Needle (PEN NEEDLES 3/16\") 31G X 5 MM MISC, TO USE 2 X DAILY, Disp: , Rfl:     Immunizations:   Most Recent Immunizations   Administered Date(s) Administered    COVID-19, MODERNA BLUE border, Primary or Immunocompromised, (age 12y+), IM, 100 mcg/0.5mL 2021    Influenza A ([id number]) Vaccine PF IM 10/25/2009    Influenza Virus Vaccine 10/17/2017    Influenza, FLUCELVAX, (age 10 mo+), MDCK, PF, 0.5mL 10/18/2022    Pneumococcal Conjugate 13-valent (Dheuujy82) 2017    Pneumococcal Polysaccharide (Tebhqminh30) 2018    Tdap (Boostrix, Adacel) 2020    Zoster Live (Zostavax) 2014    Zoster Recombinant (Shingrix) 2021     Home Blood Glucose Results: See below                Blood glucose trends noted:  Improving    ASSESSMENT     Recent A1c: Next due 22    Lab Results   Component Value Date    LABA1C 8.4 10/18/2022      Eating patterns: Patient has been more focused on carb counting than ever before. Exercise patterns: Not currently exercising     Blood Glucose Testing: Patient is currently using CGM    Current Diabetic Medications: Metformin, Humalog, Lantus     Diabetic Regimen/Compliance: Compliant most of the time. Sometimes omits Humalog intentionally. Other Medication Compliance: Compliant      PLAN   Diabetic Action Plan is shown below. Patient and provider worked together to create goals which patient will work toward prior to the next appointment.     New insulin dosing guide reviewed with patient:          Physician Follow-up:  As scheduled    Medication Management Follow-up:   Diabetes Service as above    Electronically signed by Asha Johnson [facility] on 2022 at 3:25 PM    For Pharmacy Admin Tracking Only    Intervention Detail: Adherence Monitorin, Dose Adjustment: 1, reason: Therapy Optimization, and New Rx: 1, reason: Needs Additional Therapy  Total # of Interventions Recommended: 3  Total # of Interventions Accepted: 3  Time Spent (min): 60

## 2022-12-03 LAB
ADALIMUMAB: NOT DETECTED
ADALUMUMAB NEUT AB: NORMAL
EER ADALIMUMAB: NORMAL

## 2022-12-05 ENCOUNTER — TELEPHONE (OUTPATIENT)
Dept: PHARMACY | Age: 62
End: 2022-12-05

## 2022-12-05 NOTE — TELEPHONE ENCOUNTER
Reached out to patient after reviewing [facility]. She states she has been doing well with adjusting to carb estimation with her eating and has found [employer] kirill to be useful. We will increase her Tresiba dose to 85 units (from 80) to bring the fasting number down as it is consistently high. She has not had the episodes of hypoglycemia as she was previoulsy and has not had any symptoms of high BG. Will recheck trends in 3 days and assess for further change.

## 2022-12-08 ENCOUNTER — HOSPITAL ENCOUNTER (OUTPATIENT)
Dept: PHARMACY | Age: 62
Setting detail: THERAPIES SERIES
End: 2022-12-08
Payer: COMMERCIAL

## 2022-12-09 ENCOUNTER — HOSPITAL ENCOUNTER (OUTPATIENT)
Dept: PHARMACY | Age: 62
Discharge: HOME | End: 2022-12-09

## 2022-12-09 NOTE — PROGRESS NOTES
Diabetes Medication Management Telephone Follow-Up     Blood glucose readings and trends: Starting to stabilize more. Still elevated in the morning with gradual increase from 12am to waking time. Missed doses:  No missed doses but patient did not do full sliding scale in the morning - she took only 2 units for her Glucerna but not to correct the elevated BG. Educated patient on that and will be reviewing trends in 3 days. Medication changes  no  Diet/exercise changes   yes - Has been blown away with strict carb counted  Symptoms of hypoglycemia no - none  Symptoms of hyperglycemia no - none  Medication adverse reactions none  OTHER: I feel good. Plan: Continue 85 Units Tresiba in the evening and current correct and cover sliding scale with Humalog. Next follow-up:  Will check [employer] faby in 3 days to assess trends    For Pharmacy Admin Tracking Only    Intervention Detail: Adherence Monitorin  Total # of Interventions Recommended: 1  Total # of Interventions Accepted: 1  Time Spent (min): 15

## 2022-12-14 ENCOUNTER — APPOINTMENT (OUTPATIENT)
Dept: PHARMACY | Age: 62
End: 2022-12-14
Payer: COMMERCIAL

## 2022-12-15 ENCOUNTER — APPOINTMENT (OUTPATIENT)
Dept: PHARMACY | Age: 62
End: 2022-12-15
Payer: COMMERCIAL

## 2022-12-16 ENCOUNTER — APPOINTMENT (OUTPATIENT)
Dept: PHARMACY | Age: 62
End: 2022-12-16
Payer: COMMERCIAL

## 2022-12-20 ENCOUNTER — HOSPITAL ENCOUNTER (OUTPATIENT)
Dept: PHARMACY | Age: 62
Setting detail: THERAPIES SERIES
Discharge: HOME | End: 2022-12-20

## 2022-12-20 NOTE — PROGRESS NOTES
Diabetes Medication Management Telephone Follow-Up     Blood glucose readings and trends: Very elevated recently. Patient states she has been sick the last 2 weeks which may be contributing. Missed doses:  Per Luiz Valentine it is not clear if patient is taking correct units of Humalog in the morning prior to breakfast. It looks like she is only giving units to cover for her carbs but not to correct elevation. Spoke with patient who confirms she has not been remembering to correct for elevated BG in the morning but will start doing that tomorrow. Medication changes  no  Diet/exercise changes   no  Symptoms of hypoglycemia no - none  Symptoms of hyperglycemia - no - none  Medication adverse reactions none    Plan: Resume Metformin 500mg twice daily to help BG stabilize. Will consider trial of GLP1-A after patient is feeling better. Next follow-up:  Will check faby 12/22 and reach out to patient    For Pharmacy Admin Tracking Only    Intervention Detail: Dose Adjustment: 1, reason: Therapy Optimization  Total # of Interventions Recommended: 1  Total # of Interventions Accepted: 1  Time Spent (min): 15

## 2022-12-22 ENCOUNTER — HOSPITAL ENCOUNTER (OUTPATIENT)
Dept: PHARMACY | Age: 62
Setting detail: THERAPIES SERIES
Discharge: HOME | End: 2022-12-22

## 2022-12-22 PROCEDURE — 99211 OFF/OP EST MAY X REQ PHY/QHP: CPT

## 2022-12-22 NOTE — PROGRESS NOTES
Patient reminded to do correction insulin in the morning in addition to coverage for carb intake. Since resuming Metformin two days ago there seem to be less high peaks in BG. Will continue to monitor.

## 2022-12-22 NOTE — TELEPHONE ENCOUNTER
Refill requested for Tresiba to be sent to [facility] in [place].   For Pharmacy Admin Tracking Only    Intervention Detail: Refill(s) Provided  Total # of Interventions Recommended: 1  Total # of Interventions Accepted: 1  Time Spent (min): 5

## 2022-12-30 ENCOUNTER — TELEPHONE (OUTPATIENT)
Dept: PHARMACY | Age: 62
End: 2022-12-30

## 2022-12-30 NOTE — TELEPHONE ENCOUNTER
Yanique Jonas informed this clinic that her prescription for Evern Augusta was subbed with [facility] by her pharmacy. Currently using up her Tresiba sample at home.

## 2023-01-04 ENCOUNTER — TELEPHONE (OUTPATIENT)
Dept: PHARMACY | Age: 63
End: 2023-01-04

## 2023-01-07 DIAGNOSIS — G47.00 INSOMNIA, UNSPECIFIED TYPE: ICD-10-CM

## 2023-01-08 RX ORDER — ZOLPIDEM TARTRATE 5 MG/1
TABLET ORAL
Qty: 60 TABLET | Refills: 0 | Status: SHIPPED | OUTPATIENT
Start: 2023-01-08 | End: 2023-02-07

## 2023-01-12 RX ORDER — BLOOD-GLUCOSE SENSOR
EACH MISCELLANEOUS
Qty: 2 EACH | Refills: 5 | Status: SHIPPED | OUTPATIENT
Start: 2023-01-12

## 2023-01-12 NOTE — TELEPHONE ENCOUNTER
Patient requests eHarmony 3 sensors to be sent to [facility] in [place].   For Pharmacy Admin Tracking Only    Intervention Detail: New Rx: 1, reason: Patient Preference  Total # of Interventions Recommended: 1  Total # of Interventions Accepted: 1  Time Spent (min): 10

## 2023-01-18 ENCOUNTER — TELEPHONE (OUTPATIENT)
Dept: PHARMACY | Age: 63
End: 2023-01-18

## 2023-01-18 NOTE — TELEPHONE ENCOUNTER
Patient LVM stating she was not going to be able to make her appointment today but would like to make contact via phone. I attempted to call back but had to LVM.

## 2023-01-19 NOTE — TELEPHONE ENCOUNTER
Tried to reach out today to discuss BG trends but had to LVM. Today BG was in therapeutic range for the first time in weeks. I requested call back to discuss what may have been different.

## 2023-01-20 ENCOUNTER — TELEPHONE (OUTPATIENT)
Dept: PHARMACY | Age: 63
End: 2023-01-20

## 2023-01-20 NOTE — TELEPHONE ENCOUNTER
Followed up with patient via phone regarding bg trends. Patient is very frustrated with weight gain. She states she has been taking the 85U of Toujeo and following the sliding scale. We discussed a specific scenario regarding a sandwich  and it turned out that time she did not inject any insulin prior. Educated patient to be sure to follow the scale every time to ensure more optimal BG control. Patient is interested in adding an additional agent such as a GLP-1a, specifically Mounjaro. Advised I will look into this more to confirm she would be a good candidate. Previously I recommended Trulicity over Ozempic for her due to her underlying retinopathy. She states she is very hesitant to try Trulicity again because he took one other time and was curled up in a ball for 3 days. Advised I can review the other options and make solid recommendation at our upcoming visit. For now, she will continue sliding scale prior to meals, increase Toujeo to 90U, and also increase Metformin to 500mg AM and 1000mg PM to hopefully increase peripheral absorption. Patient scheduled follow up in clinic 1/25 and A1C will be due.

## 2023-01-23 ENCOUNTER — TELEPHONE (OUTPATIENT)
Dept: FAMILY MEDICINE CLINIC | Age: 63
End: 2023-01-23

## 2023-01-23 DIAGNOSIS — Z79.4 TYPE 2 DIABETES MELLITUS WITH DIABETIC NEUROPATHY, WITH LONG-TERM CURRENT USE OF INSULIN (HCC): Primary | ICD-10-CM

## 2023-01-23 DIAGNOSIS — E11.40 TYPE 2 DIABETES MELLITUS WITH DIABETIC NEUROPATHY, WITH LONG-TERM CURRENT USE OF INSULIN (HCC): Primary | ICD-10-CM

## 2023-01-23 NOTE — TELEPHONE ENCOUNTER
Called patient to let her know Ag Jain put in lab orders for her to have done prior to surgery.   She will go to lab this week to have done

## 2023-01-24 ENCOUNTER — HOSPITAL ENCOUNTER (OUTPATIENT)
Age: 63
Setting detail: SPECIMEN
Discharge: HOME | End: 2023-01-24

## 2023-01-24 DIAGNOSIS — Z79.4 TYPE 2 DIABETES MELLITUS WITH DIABETIC NEUROPATHY, WITH LONG-TERM CURRENT USE OF INSULIN (HCC): ICD-10-CM

## 2023-01-24 DIAGNOSIS — E11.40 TYPE 2 DIABETES MELLITUS WITH DIABETIC NEUROPATHY, WITH LONG-TERM CURRENT USE OF INSULIN (HCC): ICD-10-CM

## 2023-01-24 LAB
ABSOLUTE EOS #: 0.25 K/UL (ref 0–0.44)
ABSOLUTE IMMATURE GRANULOCYTE: 0.07 K/UL (ref 0–0.3)
ABSOLUTE LYMPH #: 3 K/UL (ref 1.1–3.7)
ABSOLUTE MONO #: 0.72 K/UL (ref 0.1–1.2)
BASOPHILS # BLD: 1 % (ref 0–2)
BASOPHILS ABSOLUTE: 0.1 K/UL (ref 0–0.2)
CREATININE URINE: 172.3 MG/DL (ref 28–217)
EOSINOPHILS RELATIVE PERCENT: 2 % (ref 1–4)
HCT VFR BLD CALC: 41 % (ref 36.3–47.1)
HEMOGLOBIN: 12.6 G/DL (ref 11.9–15.1)
IMMATURE GRANULOCYTES: 1 %
LYMPHOCYTES # BLD: 24 % (ref 24–43)
MCH RBC QN AUTO: 26.5 PG (ref 25.2–33.5)
MCHC RBC AUTO-ENTMCNC: 30.7 G/DL (ref 28.4–34.8)
MCV RBC AUTO: 86.3 FL (ref 82.6–102.9)
MICROALBUMIN/CREAT 24H UR: 51 MG/L
MICROALBUMIN/CREAT UR-RTO: 30 MCG/MG CREAT
MONOCYTES # BLD: 6 % (ref 3–12)
NRBC AUTOMATED: 0 PER 100 WBC
PDW BLD-RTO: 14.6 % (ref 11.8–14.4)
PLATELET # BLD: 310 K/UL (ref 138–453)
PMV BLD AUTO: 12.4 FL (ref 8.1–13.5)
RBC # BLD: 4.75 M/UL (ref 3.95–5.11)
RBC # BLD: ABNORMAL 10*6/UL
SEG NEUTROPHILS: 66 % (ref 36–65)
SEGMENTED NEUTROPHILS ABSOLUTE COUNT: 8.55 K/UL (ref 1.5–8.1)
WBC # BLD: 12.7 K/UL (ref 3.5–11.3)

## 2023-01-25 LAB
ESTIMATED AVERAGE GLUCOSE: 209 MG/DL
HBA1C MFR BLD: 8.9 % (ref 4–6)

## 2023-01-27 ENCOUNTER — TELEPHONE (OUTPATIENT)
Dept: PHARMACY | Age: 63
End: 2023-01-27

## 2023-01-30 ENCOUNTER — HOSPITAL ENCOUNTER (OUTPATIENT)
Age: 63
Setting detail: SPECIMEN
Discharge: HOME | End: 2023-01-30

## 2023-01-30 ENCOUNTER — OFFICE VISIT (OUTPATIENT)
Dept: FAMILY MEDICINE CLINIC | Age: 63
End: 2023-01-30
Payer: COMMERCIAL

## 2023-01-30 VITALS
SYSTOLIC BLOOD PRESSURE: 122 MMHG | BODY MASS INDEX: 29.77 KG/M2 | DIASTOLIC BLOOD PRESSURE: 62 MMHG | HEART RATE: 90 BPM | WEIGHT: 185.2 LBS | OXYGEN SATURATION: 95 % | TEMPERATURE: 97.1 F | HEIGHT: 66 IN

## 2023-01-30 DIAGNOSIS — Z01.818 PRE-OPERATIVE CLEARANCE: Primary | ICD-10-CM

## 2023-01-30 DIAGNOSIS — N39.0 FREQUENT UTI: ICD-10-CM

## 2023-01-30 DIAGNOSIS — R94.31 ABNORMAL EKG: ICD-10-CM

## 2023-01-30 DIAGNOSIS — I25.10 CORONARY ARTERY DISEASE INVOLVING NATIVE CORONARY ARTERY OF NATIVE HEART WITHOUT ANGINA PECTORIS: ICD-10-CM

## 2023-01-30 LAB
BILIRUBIN, POC: NORMAL
BLOOD URINE, POC: NEGATIVE
CLARITY, POC: NORMAL
COLOR, POC: YELLOW
GLUCOSE URINE, POC: NEGATIVE
KETONES, POC: NORMAL
LEUKOCYTE EST, POC: NORMAL
NITRITE, POC: NEGATIVE
PH, POC: 5.5
PROTEIN, POC: 30
SPECIFIC GRAVITY, POC: >=1.03
UROBILINOGEN, POC: 0.2

## 2023-01-30 PROCEDURE — 99214 OFFICE O/P EST MOD 30 MIN: CPT | Performed by: NURSE PRACTITIONER

## 2023-01-30 PROCEDURE — 93000 ELECTROCARDIOGRAM COMPLETE: CPT | Performed by: NURSE PRACTITIONER

## 2023-01-30 PROCEDURE — 81003 URINALYSIS AUTO W/O SCOPE: CPT | Performed by: NURSE PRACTITIONER

## 2023-01-30 RX ORDER — INSULIN GLARGINE 300 U/ML
INJECTION, SOLUTION SUBCUTANEOUS
Qty: 7 ADJUSTABLE DOSE PRE-FILLED PEN SYRINGE | Refills: 2 | Status: SHIPPED | OUTPATIENT
Start: 2023-01-30

## 2023-01-30 RX ORDER — ADALIMUMAB 40MG/0.4ML
KIT SUBCUTANEOUS
COMMUNITY
Start: 2022-11-21

## 2023-01-30 RX ORDER — INSULIN GLARGINE 300 U/ML
INJECTION, SOLUTION SUBCUTANEOUS
COMMUNITY
Start: 2023-01-02 | End: 2023-01-30 | Stop reason: SDUPTHER

## 2023-01-30 SDOH — ECONOMIC STABILITY: FOOD INSECURITY: WITHIN THE PAST 12 MONTHS, THE FOOD YOU BOUGHT JUST DIDN'T LAST AND YOU DIDN'T HAVE MONEY TO GET MORE.: NEVER TRUE

## 2023-01-30 SDOH — ECONOMIC STABILITY: FOOD INSECURITY: WITHIN THE PAST 12 MONTHS, YOU WORRIED THAT YOUR FOOD WOULD RUN OUT BEFORE YOU GOT MONEY TO BUY MORE.: NEVER TRUE

## 2023-01-30 ASSESSMENT — PATIENT HEALTH QUESTIONNAIRE - PHQ9
SUM OF ALL RESPONSES TO PHQ QUESTIONS 1-9: 0
SUM OF ALL RESPONSES TO PHQ QUESTIONS 1-9: 0
2. FEELING DOWN, DEPRESSED OR HOPELESS: 0
SUM OF ALL RESPONSES TO PHQ QUESTIONS 1-9: 0
SUM OF ALL RESPONSES TO PHQ9 QUESTIONS 1 & 2: 0
SUM OF ALL RESPONSES TO PHQ QUESTIONS 1-9: 0
1. LITTLE INTEREST OR PLEASURE IN DOING THINGS: 0

## 2023-01-30 ASSESSMENT — ENCOUNTER SYMPTOMS
BACK PAIN: 1
DIARRHEA: 0
CONSTIPATION: 0
CHEST TIGHTNESS: 0
NAUSEA: 0
COUGH: 0
ABDOMINAL PAIN: 0
VOMITING: 0
SHORTNESS OF BREATH: 0

## 2023-01-30 ASSESSMENT — SOCIAL DETERMINANTS OF HEALTH (SDOH): HOW HARD IS IT FOR YOU TO PAY FOR THE VERY BASICS LIKE FOOD, HOUSING, MEDICAL CARE, AND HEATING?: NOT HARD AT ALL

## 2023-01-30 NOTE — PROGRESS NOTES
[address]  [place], [address]  [phone]      Radha Sutton is a 63 y.o. female who presents today for her  medicalconditions/complaints as noted below. Radha Sutton is c/o of Surgical Clearance  . HPI:    HPI  Pt here today for evaluation of pre op clearance for upcoming rectal stimulator on 2-1-23. States she has had this in for awhile to improve rectal tone and it has not helped. She needs other testing done and can't have it done until removed. She is stable with her colostomy. States her prev. Cardiologist retired and needs new referral. Has upcoming appt with diabetes med mgmt and had recent labs. Has chronic utis' and takes macrobid PRN. States WBC always slightly high. Past Medical History:   Diagnosis Date    Colostomy in place [facility])     Frequent UTI     Osteopenia of multiple sites     Psoriatic arthritis ([facility] [address]     Type 2 diabetes mellitus without complication ([facility] [address]     since pregnancy, 17 years ago      Past Surgical History:   Procedure Laterality Date    COLOSTOMY      CORONARY ARTERY BYPASS GRAFT REDO  05/2022    x2    PACEMAKER PLACEMENT      in hip for pelvic floor     History reviewed. No pertinent family history.   Social History     Tobacco Use    Smoking status: Never    Smokeless tobacco: Never   Substance Use Topics    Alcohol use: Not on file      Current Outpatient Medications   Medication Sig Dispense Refill    B COMPLEX VITAMINS PO Take by mouth daily      Probiotic Product (PROBIOTIC BLEND PO) Take by mouth      insulin glargine, 1 unit dial, (TOUJEO SOLOSTAR) 300 UNIT/ML concentrated injection pen Inject up to 110 units under the skin once daily as directed 7 Adjustable Dose Pre-filled Pen Syringe 2    zolpidem (AMBIEN) 5 MG tablet TAKE 2 TABLETS BY MOUTH NIGHTLY AS NEEDED FOR SLEEP 60 tablet 0    estradiol (ESTRACE) 0.1 MG/GM vaginal cream APPLY A FINGERTIP DOSE VAGINALLY EVERY OTHER DAY AT NIGHT      venlafaxine (EFFEXOR XR) 75 MG extended release capsule venlafaxine ER 75 mg capsule,extended release 24 hr 90 capsule 1    metFORMIN (GLUCOPHAGE) 500 MG tablet Take one tablet by mouth twice daily. 60 tablet 2    insulin lispro, 1 Unit Dial, (HUMALOG KWIKPEN) 100 UNIT/ML SOPN Inject 20 units plus sliding scale prior to meals three times daily (Up to 90 units daily) 10 Adjustable Dose Pre-filled Pen Syringe 5    metoprolol tartrate (LOPRESSOR) 25 MG tablet Take 1 tablet by mouth daily      aspirin 81 MG EC tablet Take 1 tablet by mouth daily      atorvastatin (LIPITOR) 80 MG tablet Take 80 mg by mouth daily      MAGNESIUM PO Take by mouth      Turmeric (QC TUMERIC COMPLEX PO)       Cholecalciferol (VITAMIN D3 PO) 1 capsule      HUMIRA PEN 40 MG/0.4ML PNKT  (Patient not taking: Reported on 1/30/2023)      insulin lispro, 0.5 Unit Dial, (INSULIN LISPRO) 100 UNIT/ML SOPN 3 times daily (before meals)      Continuous Blood Gluc Sensor (FREESTYLE JUHI 3 SENSOR) MISC Change sensor every 14 days for continuous glucose monitoring. 2 each 5    Lancets 33G MISC Ue 4 times daily to check your blood glucose      Continuous Blood Gluc Sensor (FREESTYLE JUHI 2 SENSOR) MISC Change sensor every 14 days for continuous glucose management 2 each 5    Insulin Pen Needle (PEN NEEDLES 3/16\") 31G X 5 MM MISC TO USE 2 X DAILY       No current facility-administered medications for this visit.      Allergies   Allergen Reactions    Cephalexin      Other reaction(s): full body rash      Dulaglutide      Other reaction(s): severe abdominal pain    Fenofibrate      Other reaction(s): rash    Fenofibrate Micronized     Liraglutide      Other reaction(s): stomach upset    Nitrofurantoin     Amoxicillin Rash    Sulfa Antibiotics Rash       Health Maintenance   Topic Date Due    Diabetic foot exam  Never done    GFR test (Diabetes, CKD 3-4, OR last GFR 15-59)  02/09/2013    COVID-19 Vaccine (4 - Booster for Moderna series) 01/01/2022    Cervical cancer screen  04/17/2025 (Originally 4/18/1981)    Depression Screen  07/14/2023    Lipids  08/11/2023    Breast cancer screen  10/05/2023    Diabetic retinal exam  10/14/2023    A1C test (Diabetic or Prediabetic)  01/24/2024    Diabetic Alb to Cr ratio (uACR) test  01/24/2024    Pneumococcal 0-64 years Vaccine (3 - PPSV23 if available, else PCV20) 04/18/2027    DTaP/Tdap/Td vaccine (2 - Td or Tdap) 01/09/2030    Colorectal Cancer Screen  04/14/2032    Flu vaccine  Completed    Shingles vaccine  Completed    Hepatitis C screen  Completed    HIV screen  Completed    Hepatitis A vaccine  Aged Out    Hib vaccine  Aged Out    Meningococcal (ACWY) vaccine  Aged Out       Subjective:      Review of Systems   Constitutional:  Negative for appetite change, chills, diaphoresis, fatigue and fever. Eyes:  Negative for visual disturbance. Respiratory:  Negative for cough, chest tightness and shortness of breath. Cardiovascular:  Negative for chest pain, palpitations and leg swelling. Gastrointestinal:  Negative for abdominal pain, constipation, diarrhea, nausea and vomiting. Genitourinary:  Negative for difficulty urinating, dysuria, hematuria and vaginal discharge. Musculoskeletal:  Positive for back pain. Skin:  Negative for rash. Neurological:  Negative for dizziness, weakness and headaches. Hematological:  Negative for adenopathy. Psychiatric/Behavioral:  Negative for sleep disturbance. Objective:      Physical Exam  Vitals and nursing note reviewed. Constitutional:       General: She is not in acute distress. Appearance: Normal appearance. She is well-developed. She is not ill-appearing or diaphoretic. Comments: /62 (Site: Right Upper Arm, Position: Sitting, Cuff Size: Medium Adult)   Pulse 90   Temp 97.1 °F (36.2 °C) (Tympanic)   Ht 5' 6\" (1.676 m)   Wt 185 lb 3.2 oz (84 kg)   SpO2 95%   BMI 29.89 kg/m²      HENT:      Head: Normocephalic and atraumatic.       Right Ear: Hearing, tympanic membrane, ear canal and external ear normal.      Left Ear: Hearing, tympanic membrane, ear canal and external ear normal.      Nose: Nose normal.      Right Sinus: No maxillary sinus tenderness or frontal sinus tenderness. Left Sinus: No maxillary sinus tenderness or frontal sinus tenderness. Mouth/Throat:      Pharynx: Uvula midline. No oropharyngeal exudate. Eyes:      Conjunctiva/sclera: Conjunctivae normal.   Cardiovascular:      Rate and Rhythm: Normal rate and regular rhythm. Pulses: Normal pulses. Heart sounds: Normal heart sounds. No murmur heard. Comments: No LE edema  Pulmonary:      Effort: Pulmonary effort is normal. No respiratory distress. Breath sounds: Normal breath sounds. No wheezing. Abdominal:      Palpations: Abdomen is soft. Tenderness: There is no abdominal tenderness. Musculoskeletal:      Cervical back: Normal range of motion and neck supple. Lymphadenopathy:      Cervical: No cervical adenopathy. Skin:     General: Skin is warm and dry. Capillary Refill: Capillary refill takes less than 2 seconds. Findings: No rash. Neurological:      General: No focal deficit present. Mental Status: She is alert and oriented to person, place, and time. Mental status is at baseline. Psychiatric:         Mood and Affect: Mood normal.         Behavior: Behavior normal.         Thought Content: Thought content normal.         Judgment: Judgment normal.       Assessment:       Diagnosis Orders   1. Pre-operative clearance  EKG 12 Lead - Clinic Performed      2. Coronary artery disease involving native coronary artery of native heart without angina pectoris  [facility]      3. Abnormal EKG  [facility]      4. Frequent UTI  Culture, Urine    POCT Urinalysis No Micro (Auto)      Ekg:  Nsr  with possible lateral ischemia ?   Results for orders placed or performed in visit on 01/30/23   POCT Urinalysis No Micro (Auto)   Result Value Ref Range    Color, UA yellow     Clarity, UA cloudy     Glucose, UA POC negative     Bilirubin, UA small     Ketones, UA trace     Spec Grav, UA >=1.030     Blood, UA POC negative     pH, UA 5.5     Protein, UA POC 30     Urobilinogen, UA 0.2     Leukocytes, UA trace     Nitrite, UA negative      Lab Results   Component Value Date    WBC 12.7 (H) 01/24/2023    HGB 12.6 01/24/2023    HCT 41.0 01/24/2023    MCV 86.3 01/24/2023     01/24/2023     Lab Results   Component Value Date    LABMICR 30 (H) 01/24/2023         Plan:      Return if symptoms worsen or fail to improve. Orders Placed This Encounter   Procedures    Culture, Urine     Standing Status:   Future     Standing Expiration Date:   1/30/2024     Order Specific Question:   Specify (ex-cath, midstream, cysto, etc)? Answer:   midstream    [facility]     Referral Priority:   Routine     Referral Type:   Eval and Treat     Referral Reason:   Specialty Services Required     Requested Specialty:   Cardiology     Number of Visits Requested:   1    POCT Urinalysis No Micro (Auto)    EKG 12 Lead - Clinic Performed     Order Specific Question:   Reason for Exam?     Answer: Other     Comments:   medical clearance       Cardio recently retired,  new referral placed  [facility] today before clearance  Has long standing wbc elevation at 12-13 range    Has appt with diabetes med mgmt upcoming for a1c mgmt  Possibly starting mounjaro , concern for starting farxiga with freq. Uti's  Send urine for cx today    Patient given educational materials - see patient instructions. Discussed use,benefit, and side effects of prescribed medications. All patient questions answered. Pt voiced understanding. Reviewed health maintenance. Instructed to continue currentmedications, diet and exercise.     Electronically signed by TYLER Cordoba CNP, CNP on 1/30/2023 at 4:06 PM

## 2023-01-30 NOTE — PROGRESS NOTES
Visit Information    Have you changed or started any medications since your last visit including any over-the-counter medicines, vitamins, or herbal medicines? no   Have you stopped taking any of your medications? Is so, why? -  no  Are you having any side effects from any of your medications? - no    Have you seen any other physician or provider since your last visit?  no   Have you had any other diagnostic tests since your last visit?  no   Have you been seen in the emergency room and/or had an admission in a hospital since we last saw you?  no   Have you had your routine dental cleaning in the past 6 months?  no     Do you have an active MyChart account? If no, what is the barrier?   Yes    Patient Care Team:  TYLER Ross CNP as PCP - General (Family Nurse Practitioner)  TYLER Ross CNP as PCP - [facility] Jabari Guzman Provider    Medical History Review  Past Medical, Family, and Social History reviewed and  contribute to the patient presenting condition    Health Maintenance   Topic Date Due    Diabetic foot exam  Never done    GFR test (Diabetes, CKD 3-4, OR last GFR 15-59)  02/09/2013    COVID-19 Vaccine (4 - Booster for Lavone Nat series) 01/01/2022    Cervical cancer screen  04/17/2025 (Originally 4/18/1981)    Depression Screen  07/14/2023    Lipids  08/11/2023    Breast cancer screen  10/05/2023    Diabetic retinal exam  10/14/2023    A1C test (Diabetic or Prediabetic)  01/24/2024    Diabetic Alb to Cr ratio (uACR) test  01/24/2024    Pneumococcal 0-64 years Vaccine (3 - PPSV23 if available, else PCV20) 04/18/2027    DTaP/Tdap/Td vaccine (2 - Td or Tdap) 01/09/2030    Colorectal Cancer Screen  04/14/2032    Flu vaccine  Completed    Shingles vaccine  Completed    Hepatitis C screen  Completed    HIV screen  Completed    Hepatitis A vaccine  Aged Out    Hib vaccine  Aged Out    Meningococcal (ACWY) vaccine  Aged Out

## 2023-01-31 ENCOUNTER — TELEPHONE (OUTPATIENT)
Dept: FAMILY MEDICINE CLINIC | Age: 63
End: 2023-01-31

## 2023-01-31 ENCOUNTER — HOSPITAL ENCOUNTER (OUTPATIENT)
Age: 63
Setting detail: SPECIMEN
Discharge: HOME | End: 2023-01-31

## 2023-01-31 DIAGNOSIS — R94.31 ABNORMAL EKG: ICD-10-CM

## 2023-01-31 DIAGNOSIS — R94.31 ABNORMAL EKG: Primary | ICD-10-CM

## 2023-01-31 DIAGNOSIS — N39.0 FREQUENT UTI: ICD-10-CM

## 2023-01-31 LAB — TROPONIN, HIGH SENSITIVITY: 16 NG/L (ref 0–14)

## 2023-02-01 ENCOUNTER — OFFICE VISIT (OUTPATIENT)
Dept: CARDIOLOGY CLINIC | Age: 63
End: 2023-02-01

## 2023-02-01 VITALS
WEIGHT: 185 LBS | HEIGHT: 66 IN | RESPIRATION RATE: 18 BRPM | SYSTOLIC BLOOD PRESSURE: 138 MMHG | HEART RATE: 81 BPM | BODY MASS INDEX: 29.73 KG/M2 | DIASTOLIC BLOOD PRESSURE: 83 MMHG

## 2023-02-01 DIAGNOSIS — I25.810 ATHEROSCLEROSIS OF CORONARY ARTERY BYPASS GRAFT OF NATIVE HEART WITHOUT ANGINA PECTORIS: ICD-10-CM

## 2023-02-01 DIAGNOSIS — I25.10 CORONARY ARTERY DISEASE INVOLVING NATIVE CORONARY ARTERY OF NATIVE HEART WITHOUT ANGINA PECTORIS: ICD-10-CM

## 2023-02-01 DIAGNOSIS — Z01.810 PREOP CARDIOVASCULAR EXAM: Primary | ICD-10-CM

## 2023-02-01 DIAGNOSIS — E78.2 MIXED HYPERLIPIDEMIA: ICD-10-CM

## 2023-02-01 LAB
MICROORGANISM SPEC CULT: ABNORMAL
SPECIMEN DESCRIPTION: ABNORMAL

## 2023-02-01 NOTE — PROGRESS NOTES
[address] [facility] [place] [address]  Dept: [phone]    Subjective: The patient is a 63y.o. year old, , female is referred here because patient had EKG done yesterday preop for her anal sphincter stimulator to be out in [facility] [employer] [facility] clinic. EKG shows T inversion in lead I and aVL. Unfortunately no old EKG to compare with  Patient do have a history of coronary artery disease history of chest pain last year went to the ER had a stress test done was positive had a cath done two-vessel disease patient underwent CABG, LIMA to LAD, SVG to RCA by Dr. Anurag Granda  Patient denies any chest pain pressure any unusual shortness of breath any lightheaded dizzy patient denies any significant ankle swelling as patient job is sitting can feel little bit in the end of the day which improved overnight  Patient denies any paroxysmal nocturnal dyspnea  Denies any problem with the blood pressure  Patient is a diabetic  According to her she has gained weight patient is not aware of her thyroid function test  Yesterday with EKG changes patient was ordered to get the Texie Jered which is 18, normal.      Past Medical History:   has a past medical history of Colostomy in place [facility]), Frequent UTI, Osteopenia of multiple sites, Psoriatic arthritis ([facility] [address] and Type 2 diabetes mellitus without complication ([facility] [address] Past Surgical History:   has a past surgical history that includes colostomy; pacemaker placement; and Coronary artery bypass graft (05/2022). Home Medications:  Prior to Admission medications    Medication Sig Start Date End Date Taking?  Authorizing Provider   HUMIRA PEN 40 MG/0.4ML PNKT  11/21/22  Yes Historical Provider, MD   B COMPLEX VITAMINS PO Take by mouth daily   Yes Historical Provider, MD   Probiotic Product (PROBIOTIC BLEND PO) Take by mouth   Yes Historical Provider, MD   insulin glargine, 1 unit dial, Bobby Rodas SOLOSTAR) 300 UNIT/ML concentrated injection pen Inject up to 110 units under the skin once daily as directed 1/30/23  Yes TYLER Padgett CNP   insulin lispro, 0.5 Unit Dial, (INSULIN LISPRO) 100 UNIT/ML SOPN 3 times daily (before meals)   Yes Historical Provider, MD   Continuous Blood Gluc Sensor (FREESTYLE JUHI 3 SENSOR) MISC Change sensor every 14 days for continuous glucose monitoring. 1/12/23  Yes TYLER Padgett CNP   zolpidem (AMBIEN) 5 MG tablet TAKE 2 TABLETS BY MOUTH NIGHTLY AS NEEDED FOR SLEEP 1/8/23 2/7/23 Yes TYLER Padgett CNP   estradiol (ESTRACE) 0.1 MG/GM vaginal cream APPLY A FINGERTIP DOSE VAGINALLY EVERY OTHER DAY AT NIGHT 10/28/22  Yes Historical Provider, MD   Lancets 33G MISC Ue 4 times daily to check your blood glucose 11/18/22  Yes Historical Provider, MD   venlafaxine (EFFEXOR XR) 75 MG extended release capsule venlafaxine ER 75 mg capsule,extended release 24 hr 11/21/22  Yes TYLER Padgett CNP   metFORMIN (GLUCOPHAGE) 500 MG tablet Take one tablet by mouth twice daily.  11/2/22  Yes TYLER Padgett CNP   Continuous Blood Gluc Sensor (FREESTYLE JUHI 2 SENSOR) MISC Change sensor every 14 days for continuous glucose management 11/2/22  Yes TYLER Padgett CNP   insulin lispro, 1 Unit Dial, (HUMALOG KWIKPEN) 100 UNIT/ML SOPN Inject 20 units plus sliding scale prior to meals three times daily (Up to 90 units daily) 11/2/22  Yes TYLER Padgett CNP   metoprolol tartrate (LOPRESSOR) 25 MG tablet Take 1 tablet by mouth daily 5/29/22  Yes Historical Provider, MD   aspirin 81 MG EC tablet Take 1 tablet by mouth daily 5/26/22  Yes Historical Provider, MD   atorvastatin (LIPITOR) 80 MG tablet Take 80 mg by mouth daily   Yes Historical Provider, MD   MAGNESIUM PO Take by mouth   Yes Historical Provider, MD   Turmeric (QC TUMERIC COMPLEX PO)    Yes Historical Provider, MD   Cholecalciferol (VITAMIN D3 PO) 1 capsule   Yes Historical Provider, MD   Insulin Pen Needle (PEN NEEDLES 3/16\") 31G X 5 MM MISC TO USE 2 X DAILY   Yes Historical Provider, MD       Allergies:  Cephalexin, Dulaglutide, Fenofibrate, Fenofibrate micronized, Liraglutide, Nitrofurantoin, Amoxicillin, and Sulfa antibiotics    Social History:   reports that she has never smoked. She has never used smokeless tobacco.    Review of Systems:  Constitutional: there has been no unanticipated weight loss. There's been No change in energy level, No change in activity level.     Eyes: No visual changes or diplopia. No scleral icterus.  ENT: No Headaches, hearing loss or vertigo. No mouth sores or sore throat.  Cardiovascular: As above.  Respiratory: No SOB, cough or hemoptysis.  Gastrointestinal: No abdominal pain, appetite loss, blood in stools. No change in bowel or bladder habits.  Genitourinary: No dysuria, trouble voiding, or hematuria.  Musculoskeletal:  No gait disturbance, No weakness or joint complaints.  Integumentary: No rash or pruritis.  Psychiatric: No anxiety, or depression.  Hematologic/Lymphatic: No abnormal bruising or bleeding, blood clots or swollen lymph nodes.  Allergic/Immunologic: No nasal congestion or hives.    Physical Exam:  /83 (Site: Right Upper Arm, Position: Sitting, Cuff Size: Medium Adult)   Pulse 81   Resp 18   Ht 5' 6\" (1.676 m)   Wt 185 lb (83.9 kg)   BMI 29.86 kg/m²     Constitutional and General Appearance: alert, cooperative, no distress and appears stated age  HEENT: PERRL, no cervical lymphadenopathy. No masses palpable. Normal oral mucosa  Respiratory:  Normal excursion and expansion without use of accessory muscles  Resp Auscultation: Good respiratory effort. No for increased work of breathing. On auscultation: clear to auscultation bilaterally  Cardiovascular:  The apical impulse is not displaced  Heart tones are crisp and normal. regular S1 and S2.  Jugular venous pulsation Normal  The carotid upstroke is normal in amplitude and  contour without delay or bruit  Peripheral pulses are symmetrical and full   Abdomen:   No masses or tenderness  Bowel sounds present  Extremities:   No Cyanosis or Clubbing   Lower extremity edema: No   Skin: Warm and dry    Cardiac Data:  EKG: Normal sinus rhythm, T inversion in lead I aVL    Labs:     CBC: No results for input(s): WBC, HGB, HCT, PLT in the last 72 hours. BMP: No results for input(s): NA, K, CO2, BUN, CREATININE, LABGLOM, GLUCOSE in the last 72 hours. PT/INR: No results for input(s): PROTIME, INR in the last 72 hours. FASTING LIPID PANEL:No results found for: CHOL, HDL, LDLDIRECT, LDLCHOLESTEROL, TRIG, LABVLDL, CHOLHDLRATIO  LIVER PROFILE:No results for input(s): AST, ALT, LABALBU in the last 72 hours. IMPRESSION:    Patient Active Problem List   Diagnosis    Mixed hyperlipidemia    Neuromuscular dysfunction of bladder, unspecified    Nontoxic single thyroid nodule    Other specified hypothyroidism    Parastomal ulcer of enterostomy (HCC)    Type 2 diabetes mellitus with diabetic neuropathy (HCC)    Coronary artery disease involving native coronary artery of native heart without angina pectoris    Neuropathic pain    Peripheral vascular disease (HCC)    Ulcerative colitis (HCC)       RECOMMENDATIONS:   CAD- History of stent (CABG- clinically doing fine)    EKG changes only to inversion in lead I and aVL with no old EKG to compare given the history of CABG no symptoms could be normal variant. NO CHEST PAIN, CONTINUE CURRENT MEDICATIONS  Oviyenny Uribe is 20 normal    Patient can have surgery done with low risk.       Patient is advised to check thyroid function test for slowly weight gain  Will get echocardiogram to have a baseline ejection fraction    HYPERTENSION- WELL CONTROLLED, WILL CONTINUE CURRENT MEDICATIONS     HYPERLIPIDEMIA- ON STATIN, NEEDS TO WATCH LIPID PROFILE AND LFT'S    DIABETES MELITIS AS PER PRIMARY CARE PROVIDER    DISCUSSED IN DETAILS ABOUT [address] [facility] CARDIOLOGY   [address] [address]  Dept: [phone]    SCHEDULE, [facility] [place] CARD        2/1/2023 (void after 30 days)      Re:  Randa Meckel            1960      Surgeon:     Procedure/Surgery: Rectal stimulator removal    Randa Meckel is at low risk and acceptable cardiac risk for the planned surgical procedure. She may proceed accordingly. Special Instructions:    [x]  Patient is on ASA . If needed can stop 6  days prior to surgery and resume after.    []  Patient is required to have dorothy-operative beta blocker. Sincerely,          SCHEDULE, PX [place] CARD  MONTHS, IF ANY SYMPTOM CHANGE PATIENT ADVISED TO GO TO THE EMERGENCY ROOM.           Yenifer Huber MD, MD  [facility] Cardiology Consult           [phone]

## 2023-02-02 RX ORDER — LEVOFLOXACIN 500 MG/1
500 TABLET, FILM COATED ORAL DAILY
Qty: 7 TABLET | Refills: 0 | Status: SHIPPED | OUTPATIENT
Start: 2023-02-02 | End: 2023-02-09

## 2023-02-21 ENCOUNTER — TELEPHONE (OUTPATIENT)
Dept: PHARMACY | Age: 63
End: 2023-02-21

## 2023-02-21 NOTE — TELEPHONE ENCOUNTER
Patient LVM to cancel diabetes follow up appt scheduled for tomorrow due to currently being sick with COVID. Need to reschedule and possibly review current BG trends via phone before next office visit.

## 2023-02-24 ENCOUNTER — TELEPHONE (OUTPATIENT)
Dept: FAMILY MEDICINE CLINIC | Age: 63
End: 2023-02-24

## 2023-02-24 NOTE — TELEPHONE ENCOUNTER
Pt called stated she tested positive for covid on Monday 2/20/23 asked if Maris Stanley could [facility]     Please advise

## 2023-03-07 DIAGNOSIS — G47.00 INSOMNIA, UNSPECIFIED TYPE: ICD-10-CM

## 2023-03-08 RX ORDER — ZOLPIDEM TARTRATE 5 MG/1
TABLET ORAL
Qty: 60 TABLET | Refills: 1 | Status: SHIPPED | OUTPATIENT
Start: 2023-03-08 | End: 2023-04-07

## 2023-03-22 NOTE — TELEPHONE ENCOUNTER
Patient called stating that she would like to start [facility]. Called patient back and LVM to schedule appointment to assess current BG trends, dietary intake, and potentially initiate new agent.

## 2023-04-26 ENCOUNTER — HOSPITAL ENCOUNTER (OUTPATIENT)
Dept: PHARMACY | Age: 63
Setting detail: THERAPIES SERIES
Discharge: HOME | End: 2023-04-26
Payer: COMMERCIAL

## 2023-04-26 NOTE — PROGRESS NOTES
Diabetes Medication Management Telephone Follow-Up     Blood glucose readings and trends: Hypoglycemia over night. Missed doses:  no  Medication changes  Yes - Did not start Mounjaro until Friday 4/21/23; Reduced insulin from 90 units to 80 units on 4/23/23 (for the last 3 days) - still experiencing lows overnight as above. Diet/exercise changes   yes - feeling less hungry - eating has been easier - has been feeling satisfied. Has been eating smaller portions. Symptoms of hypoglycemia no -  alarm wakes her up. Symptoms of hyperglycemia no - none  Medication adverse reactions  Since starting Mounjaro - Minor headaches -  . So far has not had GI side effects. Plan: Reduce Toujeo to 70 units once daily. Continue all other meds. Next follow-up: Telephone f/u to assess trends in 1 week.     For Pharmacy Admin Tracking Only    Program: Medication Management  CPA in place:  Yes  Recommendation Provided To: Patient/Caregiver: 1 via Telephone  Intervention Detail: Dose Adjustment: 1, reason: Therapy De-escalation  Intervention Accepted By: Patient/Caregiver: 1  Gap Closed?: No   Time Spent (min): 15

## 2023-05-03 ENCOUNTER — HOSPITAL ENCOUNTER (OUTPATIENT)
Dept: PHARMACY | Age: 63
Setting detail: THERAPIES SERIES
Discharge: HOME | End: 2023-05-03

## 2023-05-03 NOTE — PROGRESS NOTES
Diabetes Medication Management Telephone Follow-Up     Blood glucose readings and trends: Continued hypoglycemia over night - will reduce insulin further      Missed doses:  Requested call back to confirm the info below  Medication changes    Diet/exercise changes     Symptoms of hypoglycemia   Symptoms of hyperglycemia   Medication adverse reactions     Plan: Reduce Toujeo to 60 units once daily. Continue other meds. Left patient VM with these instructions and requested call back to confirm she received the message. **Patient called back and LVM confirming she will decrease Toujeo to 60U starting 5/3**    Next follow-up: 1 week via telephone to assess trends. Patient instructed to call clinic if she is still experiencing overnight lows the next few days.     For Pharmacy Admin Tracking Only    Program: Medication Management  CPA in place:  Yes  Recommendation Provided To: Patient/Caregiver: 1 via Telephone  Intervention Detail: Dose Adjustment: 1, reason: Therapy De-escalation  Intervention Accepted By: Patient/Caregiver: 1  Gap Closed?: No   Time Spent (min): 15

## 2023-05-04 RX ORDER — INSULIN GLARGINE 300 U/ML
INJECTION, SOLUTION SUBCUTANEOUS
Qty: 10.5 ML | Refills: 0 | Status: SHIPPED | OUTPATIENT
Start: 2023-05-04 | End: 2023-06-30

## 2023-05-08 RX ORDER — INSULIN LISPRO 100 [IU]/ML
INJECTION, SOLUTION INTRAVENOUS; SUBCUTANEOUS
Qty: 30 ML | Refills: 0 | Status: SHIPPED | OUTPATIENT
Start: 2023-05-08 | End: 2023-06-26

## 2023-05-09 ENCOUNTER — TELEPHONE (OUTPATIENT)
Dept: PHARMACY | Age: 63
End: 2023-05-09

## 2023-05-09 NOTE — TELEPHONE ENCOUNTER
LVM patient to let her know I need to move her appt to Thursday at 3pm due to a schedule conflict. I requested call back to change if this new date/time is not convenient.

## 2023-05-10 ENCOUNTER — APPOINTMENT (OUTPATIENT)
Dept: PHARMACY | Age: 63
End: 2023-05-10
Payer: COMMERCIAL

## 2023-05-11 ENCOUNTER — HOSPITAL ENCOUNTER (OUTPATIENT)
Dept: PHARMACY | Age: 63
Setting detail: THERAPIES SERIES
Discharge: HOME | End: 2023-05-11
Payer: COMMERCIAL

## 2023-05-11 VITALS
SYSTOLIC BLOOD PRESSURE: 135 MMHG | WEIGHT: 173.1 LBS | OXYGEN SATURATION: 96 % | HEART RATE: 84 BPM | BODY MASS INDEX: 27.94 KG/M2 | DIASTOLIC BLOOD PRESSURE: 79 MMHG | TEMPERATURE: 97.5 F

## 2023-05-11 LAB — HBA1C MFR BLD: 7 %

## 2023-05-11 PROCEDURE — 99212 OFFICE O/P EST SF 10 MIN: CPT

## 2023-05-11 PROCEDURE — 83036 HEMOGLOBIN GLYCOSYLATED A1C: CPT

## 2023-05-11 NOTE — PROGRESS NOTES
02/09/2012    CO2 25 02/09/2012     No results found for: ALT    Weight:  Wt Readings from Last 3 Encounters:   05/11/23 173 lb 1.6 oz (78.5 kg)   04/12/23 181 lb (82.1 kg)   02/01/23 185 lb (83.9 kg)     Blood Pressure:  BP Readings from Last 3 Encounters:   05/11/23 135/79   02/01/23 138/83   01/30/23 122/62     Current medications:    Current Outpatient Medications:     insulin lispro, 1 Unit Dial, (HUMALOG KWIKPEN) 100 UNIT/ML SOPN, INJECT 20 UNITS PLUS SLIDING SCALE BEFORE MEALS THREE TIMES DAILY. MAXIMUM DAILY DOSE IS 90 UNITS, Disp: 30 mL, Rfl: 0    insulin glargine, 1 unit dial, (TOUJEO SOLOSTAR) 300 UNIT/ML concentrated injection pen, ADMINISTER UP  UNITS UNDER THE SKIN EVERY DAY AS DIRECTED, Disp: 10.5 mL, Rfl: 0    isosorbide mononitrate (IMDUR) 30 MG extended release tablet, Take 1 tablet by mouth daily (Patient not taking: Reported on 4/12/2023), Disp: , Rfl:     nitrofurantoin (MACRODANTIN) 100 MG capsule, TAKE ONE CAPSULE BY MOUTH TWICE DAILY FOR 3 DAYS AS NEEDED FOR UTI SYMPTOMS.  REPEAT THREE DAY THERAPY WHEN NEEDED, Disp: , Rfl:     Tirzepatide (MOUNJARO) 2.5 MG/0.5ML SOPN SC injection, Inject 0.5 mLs into the skin once a week, Disp: 4 Adjustable Dose Pre-filled Pen Syringe, Rfl: 0    HUMIRA PEN 40 MG/0.4ML PNKT, , Disp: , Rfl:     B COMPLEX VITAMINS PO, Take by mouth daily, Disp: , Rfl:     Probiotic Product (PROBIOTIC BLEND PO), Take by mouth, Disp: , Rfl:     Continuous Blood Gluc Sensor (FREESTYLE JUHI 3 SENSOR) MISC, Change sensor every 14 days for continuous glucose monitoring., Disp: 2 each, Rfl: 5    estradiol (ESTRACE) 0.1 MG/GM vaginal cream, APPLY A FINGERTIP DOSE VAGINALLY EVERY OTHER DAY AT NIGHT, Disp: , Rfl:     Lancets 33G MISC, Ue 4 times daily to check your blood glucose, Disp: , Rfl:     venlafaxine (EFFEXOR XR) 75 MG extended release capsule, venlafaxine ER 75 mg capsule,extended release 24 hr, Disp: 90 capsule, Rfl: 1    metFORMIN (GLUCOPHAGE) 500 MG tablet, Take one

## 2023-05-13 DIAGNOSIS — Z79.4 TYPE 2 DIABETES MELLITUS WITH DIABETIC NEUROPATHY, WITH LONG-TERM CURRENT USE OF INSULIN (HCC): Primary | ICD-10-CM

## 2023-05-13 DIAGNOSIS — E11.40 TYPE 2 DIABETES MELLITUS WITH DIABETIC NEUROPATHY, WITH LONG-TERM CURRENT USE OF INSULIN (HCC): Primary | ICD-10-CM

## 2023-05-14 RX ORDER — TIRZEPATIDE 2.5 MG/.5ML
INJECTION, SOLUTION SUBCUTANEOUS
Qty: 2 ML | Refills: 2 | Status: SHIPPED | OUTPATIENT
Start: 2023-05-14 | End: 2023-05-15

## 2023-05-21 RX ORDER — VENLAFAXINE HYDROCHLORIDE 75 MG/1
CAPSULE, EXTENDED RELEASE ORAL
Qty: 90 CAPSULE | Refills: 1 | Status: SHIPPED | OUTPATIENT
Start: 2023-05-21

## 2023-05-31 ENCOUNTER — APPOINTMENT (OUTPATIENT)
Dept: PHARMACY | Age: 63
End: 2023-05-31
Payer: COMMERCIAL

## 2023-06-01 ENCOUNTER — HOSPITAL ENCOUNTER (OUTPATIENT)
Dept: PHARMACY | Age: 63
Setting detail: THERAPIES SERIES
Discharge: HOME | End: 2023-06-01

## 2023-06-01 NOTE — PROGRESS NOTES
Diabetes Medication Management Telephone Follow-Up   Left  requesting call back to clarify Mounjaro dose increase intended for 5/27 along with tolerability and clarification of current insulin dose (had reduced to 60 U)    Blood glucose readings and trends: Meeting CGM goals              Next follow-up: As scheduled

## 2023-06-05 RX ORDER — BLOOD-GLUCOSE SENSOR
EACH MISCELLANEOUS
Qty: 2 EACH | Refills: 5 | Status: SHIPPED | OUTPATIENT
Start: 2023-06-05

## 2023-06-13 DIAGNOSIS — G47.00 INSOMNIA, UNSPECIFIED TYPE: ICD-10-CM

## 2023-06-13 RX ORDER — ZOLPIDEM TARTRATE 5 MG/1
TABLET ORAL
Qty: 60 TABLET | Refills: 1 | Status: SHIPPED | OUTPATIENT
Start: 2023-06-13 | End: 2023-07-13

## 2023-06-26 ENCOUNTER — OFFICE VISIT (OUTPATIENT)
Dept: FAMILY MEDICINE CLINIC | Age: 63
End: 2023-06-26
Payer: COMMERCIAL

## 2023-06-26 VITALS
HEART RATE: 82 BPM | BODY MASS INDEX: 25.07 KG/M2 | OXYGEN SATURATION: 98 % | DIASTOLIC BLOOD PRESSURE: 68 MMHG | HEIGHT: 66 IN | WEIGHT: 156 LBS | SYSTOLIC BLOOD PRESSURE: 110 MMHG

## 2023-06-26 DIAGNOSIS — M54.50 CHRONIC LOW BACK PAIN, UNSPECIFIED BACK PAIN LATERALITY, UNSPECIFIED WHETHER SCIATICA PRESENT: ICD-10-CM

## 2023-06-26 DIAGNOSIS — G89.29 CHRONIC LOW BACK PAIN, UNSPECIFIED BACK PAIN LATERALITY, UNSPECIFIED WHETHER SCIATICA PRESENT: ICD-10-CM

## 2023-06-26 DIAGNOSIS — E11.40 TYPE 2 DIABETES MELLITUS WITH DIABETIC NEUROPATHY, WITH LONG-TERM CURRENT USE OF INSULIN (HCC): Primary | ICD-10-CM

## 2023-06-26 DIAGNOSIS — D50.9 IRON DEFICIENCY ANEMIA, UNSPECIFIED IRON DEFICIENCY ANEMIA TYPE: ICD-10-CM

## 2023-06-26 DIAGNOSIS — R42 DIZZINESS: ICD-10-CM

## 2023-06-26 DIAGNOSIS — Z79.4 TYPE 2 DIABETES MELLITUS WITH DIABETIC NEUROPATHY, WITH LONG-TERM CURRENT USE OF INSULIN (HCC): Primary | ICD-10-CM

## 2023-06-26 DIAGNOSIS — E78.2 MIXED HYPERLIPIDEMIA: ICD-10-CM

## 2023-06-26 PROCEDURE — 99214 OFFICE O/P EST MOD 30 MIN: CPT | Performed by: NURSE PRACTITIONER

## 2023-06-26 PROCEDURE — 3051F HG A1C>EQUAL 7.0%<8.0%: CPT | Performed by: NURSE PRACTITIONER

## 2023-06-26 SDOH — ECONOMIC STABILITY: FOOD INSECURITY: WITHIN THE PAST 12 MONTHS, THE FOOD YOU BOUGHT JUST DIDN'T LAST AND YOU DIDN'T HAVE MONEY TO GET MORE.: NEVER TRUE

## 2023-06-26 SDOH — ECONOMIC STABILITY: HOUSING INSECURITY
IN THE LAST 12 MONTHS, WAS THERE A TIME WHEN YOU DID NOT HAVE A STEADY PLACE TO SLEEP OR SLEPT IN A SHELTER (INCLUDING NOW)?: NO

## 2023-06-26 SDOH — ECONOMIC STABILITY: INCOME INSECURITY: HOW HARD IS IT FOR YOU TO PAY FOR THE VERY BASICS LIKE FOOD, HOUSING, MEDICAL CARE, AND HEATING?: NOT HARD AT ALL

## 2023-06-26 SDOH — ECONOMIC STABILITY: FOOD INSECURITY: WITHIN THE PAST 12 MONTHS, YOU WORRIED THAT YOUR FOOD WOULD RUN OUT BEFORE YOU GOT MONEY TO BUY MORE.: NEVER TRUE

## 2023-06-26 ASSESSMENT — ENCOUNTER SYMPTOMS
SHORTNESS OF BREATH: 0
NAUSEA: 0
ABDOMINAL PAIN: 0
DIARRHEA: 0
COUGH: 0
VOMITING: 0
SINUS PAIN: 0
SORE THROAT: 0

## 2023-06-27 ENCOUNTER — HOSPITAL ENCOUNTER (OUTPATIENT)
Age: 63
Setting detail: SPECIMEN
Discharge: HOME | End: 2023-06-27

## 2023-06-27 DIAGNOSIS — E78.2 MIXED HYPERLIPIDEMIA: ICD-10-CM

## 2023-06-27 DIAGNOSIS — M54.50 CHRONIC LOW BACK PAIN, UNSPECIFIED BACK PAIN LATERALITY, UNSPECIFIED WHETHER SCIATICA PRESENT: ICD-10-CM

## 2023-06-27 DIAGNOSIS — R42 DIZZINESS: ICD-10-CM

## 2023-06-27 DIAGNOSIS — E11.40 TYPE 2 DIABETES MELLITUS WITH DIABETIC NEUROPATHY, WITH LONG-TERM CURRENT USE OF INSULIN (HCC): ICD-10-CM

## 2023-06-27 DIAGNOSIS — Z79.4 TYPE 2 DIABETES MELLITUS WITH DIABETIC NEUROPATHY, WITH LONG-TERM CURRENT USE OF INSULIN (HCC): ICD-10-CM

## 2023-06-27 DIAGNOSIS — G89.29 CHRONIC LOW BACK PAIN, UNSPECIFIED BACK PAIN LATERALITY, UNSPECIFIED WHETHER SCIATICA PRESENT: ICD-10-CM

## 2023-06-27 DIAGNOSIS — D50.9 IRON DEFICIENCY ANEMIA, UNSPECIFIED IRON DEFICIENCY ANEMIA TYPE: ICD-10-CM

## 2023-06-27 LAB
ALBUMIN SERPL-MCNC: 4.4 G/DL (ref 3.5–5.2)
ALBUMIN/GLOB SERPL: 1.4 {RATIO} (ref 1–2.5)
ALP SERPL-CCNC: 156 U/L (ref 35–104)
ALT SERPL-CCNC: 45 U/L (ref 5–33)
ANION GAP SERPL CALCULATED.3IONS-SCNC: 23 MMOL/L (ref 9–17)
AST SERPL-CCNC: 26 U/L
BILIRUB SERPL-MCNC: 0.8 MG/DL (ref 0.3–1.2)
BUN SERPL-MCNC: 22 MG/DL (ref 8–23)
CALCIUM SERPL-MCNC: 10.9 MG/DL (ref 8.6–10.4)
CHLORIDE SERPL-SCNC: 99 MMOL/L (ref 98–107)
CHOLEST SERPL-MCNC: 115 MG/DL
CHOLESTEROL/HDL RATIO: 2.9
CO2 SERPL-SCNC: 17 MMOL/L (ref 20–31)
CREAT SERPL-MCNC: 0.84 MG/DL (ref 0.5–0.9)
ERYTHROCYTE [DISTWIDTH] IN BLOOD BY AUTOMATED COUNT: 15.7 % (ref 11.8–14.4)
GFR SERPL CREATININE-BSD FRML MDRD: >60 ML/MIN/1.73M2
GLUCOSE SERPL-MCNC: 210 MG/DL (ref 70–99)
HCT VFR BLD AUTO: 45 % (ref 36.3–47.1)
HDLC SERPL-MCNC: 40 MG/DL
HGB BLD-MCNC: 14 G/DL (ref 11.9–15.1)
IRON SERPL-MCNC: 59 UG/DL (ref 37–145)
LDLC SERPL CALC-MCNC: 51 MG/DL (ref 0–130)
MCH RBC QN AUTO: 27.1 PG (ref 25.2–33.5)
MCHC RBC AUTO-ENTMCNC: 31.1 G/DL (ref 28.4–34.8)
MCV RBC AUTO: 87 FL (ref 82.6–102.9)
NRBC BLD-RTO: 0 PER 100 WBC
PLATELET # BLD AUTO: ABNORMAL K/UL (ref 138–453)
PLATELET, FLUORESCENCE: 246 K/UL (ref 138–453)
PLATELETS.RETICULATED NFR BLD AUTO: 13.1 % (ref 1.1–10.3)
POTASSIUM SERPL-SCNC: 3.6 MMOL/L (ref 3.7–5.3)
PROT SERPL-MCNC: 7.6 G/DL (ref 6.4–8.3)
RBC # BLD AUTO: 5.17 M/UL (ref 3.95–5.11)
SODIUM SERPL-SCNC: 139 MMOL/L (ref 135–144)
TRIGL SERPL-MCNC: 120 MG/DL
TSH SERPL DL<=0.05 MIU/L-ACNC: 2.3 UIU/ML (ref 0.3–5)
WBC OTHER # BLD: 12.9 K/UL (ref 3.5–11.3)

## 2023-06-29 DIAGNOSIS — E83.52 HYPERCALCEMIA: Primary | ICD-10-CM

## 2023-06-29 LAB — 25(OH)D3 SERPL-MCNC: 45.8 NG/ML

## 2023-06-30 RX ORDER — INSULIN GLARGINE 300 U/ML
INJECTION, SOLUTION SUBCUTANEOUS
Qty: 10.5 ML | Refills: 0 | Status: SHIPPED | OUTPATIENT
Start: 2023-06-30

## 2023-06-30 RX ORDER — INSULIN LISPRO 100 [IU]/ML
INJECTION, SOLUTION INTRAVENOUS; SUBCUTANEOUS
Qty: 30 ML | Refills: 0 | Status: SHIPPED | OUTPATIENT
Start: 2023-06-30

## 2023-07-11 ENCOUNTER — HOSPITAL ENCOUNTER (OUTPATIENT)
Dept: MRI IMAGING | Age: 63
Discharge: HOME | End: 2023-07-13
Payer: COMMERCIAL

## 2023-07-11 ENCOUNTER — HOSPITAL ENCOUNTER (OUTPATIENT)
Dept: GENERAL RADIOLOGY | Age: 63
Discharge: HOME | End: 2023-07-13
Payer: COMMERCIAL

## 2023-07-11 DIAGNOSIS — G89.29 CHRONIC LOW BACK PAIN, UNSPECIFIED BACK PAIN LATERALITY, UNSPECIFIED WHETHER SCIATICA PRESENT: ICD-10-CM

## 2023-07-11 DIAGNOSIS — M54.50 CHRONIC LOW BACK PAIN, UNSPECIFIED BACK PAIN LATERALITY, UNSPECIFIED WHETHER SCIATICA PRESENT: ICD-10-CM

## 2023-07-11 PROCEDURE — 72148 MRI LUMBAR SPINE W/O DYE: CPT

## 2023-07-11 PROCEDURE — 71045 X-RAY EXAM CHEST 1 VIEW: CPT

## 2023-07-12 RX ORDER — AZITHROMYCIN 250 MG/1
250 TABLET, FILM COATED ORAL SEE ADMIN INSTRUCTIONS
Qty: 6 TABLET | Refills: 0 | Status: SHIPPED | OUTPATIENT
Start: 2023-07-12 | End: 2023-07-17

## 2023-07-14 ENCOUNTER — HOSPITAL ENCOUNTER (OUTPATIENT)
Dept: VASCULAR LAB | Age: 63
Discharge: HOME | End: 2023-07-14
Payer: COMMERCIAL

## 2023-07-14 ENCOUNTER — HOSPITAL ENCOUNTER (OUTPATIENT)
Dept: NON INVASIVE DIAGNOSTICS | Age: 63
Discharge: HOME | End: 2023-07-14
Payer: COMMERCIAL

## 2023-07-14 ENCOUNTER — TELEPHONE (OUTPATIENT)
Dept: FAMILY MEDICINE CLINIC | Age: 63
End: 2023-07-14

## 2023-07-14 DIAGNOSIS — R42 DIZZINESS: ICD-10-CM

## 2023-07-14 DIAGNOSIS — E78.2 MIXED HYPERLIPIDEMIA: ICD-10-CM

## 2023-07-14 PROCEDURE — 93225 XTRNL ECG REC<48 HRS REC: CPT

## 2023-07-14 PROCEDURE — 93880 EXTRACRANIAL BILAT STUDY: CPT

## 2023-07-14 PROCEDURE — 93226 XTRNL ECG REC<48 HR SCAN A/R: CPT

## 2023-07-14 NOTE — TELEPHONE ENCOUNTER
Patient called wanting to know why Chris Hansen sent Azithromycin over to the pharmacy for her. I did not see a dx code or anything indicating the need for the medication. Please advise.

## 2023-07-17 ENCOUNTER — TELEPHONE (OUTPATIENT)
Dept: FAMILY MEDICINE CLINIC | Age: 63
End: 2023-07-17

## 2023-07-17 DIAGNOSIS — M43.9 COMPRESSION DEFORMITY OF VERTEBRA: Primary | ICD-10-CM

## 2023-07-17 DIAGNOSIS — M54.50 LOW BACK PAIN WITHOUT SCIATICA, UNSPECIFIED BACK PAIN LATERALITY, UNSPECIFIED CHRONICITY: Primary | ICD-10-CM

## 2023-07-17 RX ORDER — HYDROCODONE BITARTRATE AND ACETAMINOPHEN 5; 325 MG/1; MG/1
1 TABLET ORAL EVERY 6 HOURS PRN
Qty: 12 TABLET | Refills: 0 | Status: SHIPPED | OUTPATIENT
Start: 2023-07-17 | End: 2023-07-20 | Stop reason: SDUPTHER

## 2023-07-17 RX ORDER — CYCLOBENZAPRINE HCL 10 MG
10 TABLET ORAL 3 TIMES DAILY PRN
Qty: 90 TABLET | Refills: 1 | Status: SHIPPED | OUTPATIENT
Start: 2023-07-17 | End: 2023-09-15

## 2023-07-17 NOTE — TELEPHONE ENCOUNTER
What pharmacy and when is ortho appt? With who> also how often is she taking both? What was cause of back pain?

## 2023-07-17 NOTE — TELEPHONE ENCOUNTER
Patient was just referred to ortho today so they haven't contacted patient. Office faxed refrerral today. The back pain is from the MRI results given to patient today compression fracture at [facility]. Patient is taking the flexeril BID and Norco as needed but 2-3 times a day.

## 2023-07-17 NOTE — TELEPHONE ENCOUNTER
Patient's  came in stating that the patient was given flexeril 10mg for her back at the hospital and she is almost out. They would like to know if Mumtaz Estrella could possible call in a refill of the flexeril for her as well as a small script of Anahi Lacy just to get her through until she can get in to see Ortho. Please advise.

## 2023-07-20 ENCOUNTER — PATIENT MESSAGE (OUTPATIENT)
Dept: FAMILY MEDICINE CLINIC | Age: 63
End: 2023-07-20

## 2023-07-20 DIAGNOSIS — M54.50 LOW BACK PAIN WITHOUT SCIATICA, UNSPECIFIED BACK PAIN LATERALITY, UNSPECIFIED CHRONICITY: ICD-10-CM

## 2023-07-20 RX ORDER — HYDROCODONE BITARTRATE AND ACETAMINOPHEN 5; 325 MG/1; MG/1
1 TABLET ORAL EVERY 6 HOURS PRN
Qty: 12 TABLET | Refills: 0 | Status: SHIPPED | OUTPATIENT
Start: 2023-07-20 | End: 2023-07-23

## 2023-07-20 NOTE — TELEPHONE ENCOUNTER
From: Javon Hoyos  To: Say Craig  Sent: 7/20/2023 10:12 AM EDT  Subject: Candice Angel Rx request    Alexx Hair - this is Tara Morejon, [address] . You called in 12 Norco pills on Monday and she has been taking about 3 (or 4) per day to keep pain levels down. .. she has an appt with Ortho on Tuesday afternoon (the 25th) so we will be out of those 12 pills within a day or so. Can you call in some more to get us at least through when we see Ortho ? Thanks in advance for your time.

## 2023-07-28 ENCOUNTER — HOSPITAL ENCOUNTER (OUTPATIENT)
Dept: PREADMISSION TESTING | Age: 63
End: 2023-07-28
Payer: COMMERCIAL

## 2023-07-28 VITALS
HEART RATE: 72 BPM | SYSTOLIC BLOOD PRESSURE: 103 MMHG | TEMPERATURE: 97.8 F | HEIGHT: 66 IN | WEIGHT: 159.2 LBS | DIASTOLIC BLOOD PRESSURE: 49 MMHG | BODY MASS INDEX: 25.58 KG/M2

## 2023-07-28 LAB
ANION GAP SERPL CALCULATED.3IONS-SCNC: 14 MMOL/L (ref 9–17)
BACTERIA URNS QL MICRO: ABNORMAL
BASOPHILS # BLD: 0.13 K/UL (ref 0–0.2)
BASOPHILS NFR BLD: 1 % (ref 0–2)
BILIRUB UR QL STRIP: NEGATIVE
BUN SERPL-MCNC: 16 MG/DL (ref 8–23)
BUN/CREAT SERPL: 27 (ref 9–20)
CALCIUM SERPL-MCNC: 10.1 MG/DL (ref 8.6–10.4)
CASTS #/AREA URNS LPF: ABNORMAL /LPF
CASTS #/AREA URNS LPF: ABNORMAL /LPF
CHLORIDE SERPL-SCNC: 101 MMOL/L (ref 98–107)
CLARITY UR: CLEAR
CO2 SERPL-SCNC: 22 MMOL/L (ref 20–31)
COLOR UR: YELLOW
CREAT SERPL-MCNC: 0.6 MG/DL (ref 0.5–0.9)
EKG ATRIAL RATE: 69 BPM
EKG P AXIS: 27 DEGREES
EKG P-R INTERVAL: 144 MS
EKG Q-T INTERVAL: 422 MS
EKG QRS DURATION: 76 MS
EKG QTC CALCULATION (BAZETT): 452 MS
EKG R AXIS: -5 DEGREES
EKG T AXIS: 100 DEGREES
EKG VENTRICULAR RATE: 69 BPM
EOSINOPHIL # BLD: 0.33 K/UL (ref 0–0.44)
EOSINOPHILS RELATIVE PERCENT: 3 % (ref 1–4)
EPI CELLS #/AREA URNS HPF: ABNORMAL /HPF (ref 0–5)
ERYTHROCYTE [DISTWIDTH] IN BLOOD BY AUTOMATED COUNT: 16.8 % (ref 11.8–14.4)
GFR SERPL CREATININE-BSD FRML MDRD: >60 ML/MIN/1.73M2
GLUCOSE SERPL-MCNC: 135 MG/DL (ref 70–99)
GLUCOSE UR STRIP-MCNC: NEGATIVE MG/DL
HCT VFR BLD AUTO: 44.7 % (ref 36.3–47.1)
HGB BLD-MCNC: 13.7 G/DL (ref 11.9–15.1)
HGB UR QL STRIP.AUTO: NEGATIVE
IMM GRANULOCYTES # BLD AUTO: 0.07 K/UL (ref 0–0.3)
IMM GRANULOCYTES NFR BLD: 1 %
INR PPP: 0.9
KETONES UR STRIP-MCNC: NEGATIVE MG/DL
LEUKOCYTE ESTERASE UR QL STRIP: ABNORMAL
LYMPHOCYTES NFR BLD: 3.33 K/UL (ref 1.1–3.7)
LYMPHOCYTES RELATIVE PERCENT: 26 % (ref 24–43)
MCH RBC QN AUTO: 27.9 PG (ref 25.2–33.5)
MCHC RBC AUTO-ENTMCNC: 30.6 G/DL (ref 28.4–34.8)
MCV RBC AUTO: 91 FL (ref 82.6–102.9)
MONOCYTES NFR BLD: 0.62 K/UL (ref 0.1–1.2)
MONOCYTES NFR BLD: 5 % (ref 3–12)
NEUTROPHILS NFR BLD: 64 % (ref 36–65)
NEUTS SEG NFR BLD: 8.23 K/UL (ref 1.5–8.1)
NITRITE UR QL STRIP: NEGATIVE
NRBC BLD-RTO: 0 PER 100 WBC
PARTIAL THROMBOPLASTIN TIME: 32.4 SEC (ref 23.9–33.8)
PH UR STRIP: 5.5 [PH] (ref 5–8)
PLATELET # BLD AUTO: 317 K/UL (ref 138–453)
PMV BLD AUTO: 12 FL (ref 8.1–13.5)
POTASSIUM SERPL-SCNC: 4.9 MMOL/L (ref 3.7–5.3)
PROT UR STRIP-MCNC: NEGATIVE MG/DL
PROTHROMBIN TIME: 12 SEC (ref 11.5–14.2)
RBC # BLD AUTO: 4.91 M/UL (ref 3.95–5.11)
RBC # BLD: ABNORMAL 10*6/UL
RBC #/AREA URNS HPF: ABNORMAL /HPF (ref 0–2)
SODIUM SERPL-SCNC: 137 MMOL/L (ref 135–144)
SP GR UR STRIP: 1.01 (ref 1–1.03)
UROBILINOGEN UR STRIP-ACNC: NORMAL EU/DL (ref 0–1)
WBC #/AREA URNS HPF: ABNORMAL /HPF (ref 0–5)
WBC OTHER # BLD: 12.7 K/UL (ref 3.5–11.3)

## 2023-07-28 PROCEDURE — 87086 URINE CULTURE/COLONY COUNT: CPT

## 2023-07-28 PROCEDURE — 36415 COLL VENOUS BLD VENIPUNCTURE: CPT

## 2023-07-28 PROCEDURE — 85730 THROMBOPLASTIN TIME PARTIAL: CPT

## 2023-07-28 PROCEDURE — 93005 ELECTROCARDIOGRAM TRACING: CPT | Performed by: ORTHOPAEDIC SURGERY

## 2023-07-28 PROCEDURE — 85027 COMPLETE CBC AUTOMATED: CPT

## 2023-07-28 PROCEDURE — 80048 BASIC METABOLIC PNL TOTAL CA: CPT

## 2023-07-28 PROCEDURE — 85610 PROTHROMBIN TIME: CPT

## 2023-07-28 PROCEDURE — 81001 URINALYSIS AUTO W/SCOPE: CPT

## 2023-07-28 RX ORDER — UBIDECARENONE 75 MG
100 CAPSULE ORAL DAILY
COMMUNITY

## 2023-07-28 RX ORDER — CLINDAMYCIN PHOSPHATE 900 MG/50ML
900 INJECTION INTRAVENOUS ONCE
OUTPATIENT
Start: 2023-07-28

## 2023-07-28 ASSESSMENT — PAIN SCALES - GENERAL: PAINLEVEL_OUTOF10: 5

## 2023-07-28 ASSESSMENT — PAIN DESCRIPTION - LOCATION: LOCATION: BACK

## 2023-07-28 ASSESSMENT — PAIN DESCRIPTION - DESCRIPTORS: DESCRIPTORS: ACHING;THROBBING;STABBING

## 2023-07-28 NOTE — PRE-PROCEDURE INSTRUCTIONS
On the Day of Your Surgery, Wednesday, 8/9/23, Please Arrive At 5:45 AM     Enter the hospital through the Main Entrance, take the lobby elevators to the second floor and check in at the Surgery Registration desk. Continue to take your home medications as you normally do up to and including the night before surgery with the exception of blood thinning medications. Blood Thinning Medications:  Please stop prescription blood thinning medications such as Apixaban (Eliquis); Clopidogrel (Plavix); Dabigatran (Pradaxa); Prasugrel (Effient); Rivaroxaban (Xarelto); Ticagrelor (Brilinta); Warfarin (Coumadin) only as directed by your surgeon and/or the prescribing physician    Some common examples of other medications that can thin your blood are: Aspirin, Ibuprofen (Advil, Motrin), Naproxen (Aleve), Meloxicam (Mobic), Celecoxib (Celebrex), Fish Oil, many Herbal Supplements. These medications should usually be stopped at least 7 days prior to surgery. Aspirin, probiotic, vitamin B12, vitamin D3 and magnesium    Tylenol is OK to take for pain the week prior to surgery. Failure to stop certain medications may interfere with your scheduled surgery. If you receive instructions from your surgeon regarding what medications to stop prior to surgery, please follow those specific instructions. If You Have Diabetes:  Do not take any of your diabetic medications, (injectables or by mouth) the morning of surgery unless otherwise instructed by the doctor who manages your diabetes. If you are taking insulin, contact the doctor the manages your diabetes for instructions about any changes to your insulin dosages the day before surgery. Please take the following medication(s) the day of surgery with small sips of water:metoprolol                Please stop Ozempic, Trulicity and Mounjaro 1 week before surgery. Please use your inhaler(s) if needed and bring your inhaler(s) from home the day of surgery.     Showering Before Surgery: You can play an important role in your own health by carefully washing before surgery. Shower the night before and the morning of surgery using the instructions below. If you are allergic to Chlorhexidine Gluconate (CHG) use antibacterial soap instead. If you were given Chlorhexidine soap, please follow the instructions included with the soap to shower the night before and the morning of surgery. If you were not given Chlorhexidine, please shower or bathe the morning of surgery using an antibacterial soap. Please dress in clean clothing after showering. General information about Chlorhexidine Gluconate (CHG)   * It should not be used on hair, face, ears, genital area or skin that is not intact. * It should not be used if breast feeding. * It should not be used if you allergic to CHG. * See the bottle for additional information. Do Not apply any powder, deodorant, lotion/cream/oil, perfume/aftershave, cosmetics, or alcohol-based skin or hair products after showering. Do Not shave near the planned surgical site unless specifically instructed to.     1. Wash your hair using your normal shampoo and rinse. 2. Wash your face and genital area (privates) using your own shampoo and rinse. 3. Turn off the shower water and pour half of the bottle of CHG onto a clean washcloth. 4. Wash your body from neck down to toes. Pay special attention to your surgical site. 5. Leave the CHG on your skin for 5 minutes and rinse it off.   6. Pat dry with a clean towel, sleep in clean clothes and clean sheets. 7. Do not shave near the surgical site. 8. Repeat process the morning of surgery. CHG may cause dry skin, but should not cause redness, rash, or intense itching. If an suspect an allergic reaction, use your own soap and shampoo for the morning of surgery and report reaction to the pre-operative nurse. Additional Instructions:      1.  If you are having any type of anesthesia,

## 2023-07-29 LAB
MICROORGANISM SPEC CULT: NORMAL
SPECIMEN DESCRIPTION: NORMAL

## 2023-07-30 RX ORDER — INSULIN LISPRO 100 [IU]/ML
INJECTION, SOLUTION INTRAVENOUS; SUBCUTANEOUS
Qty: 30 ML | Refills: 0 | Status: SHIPPED | OUTPATIENT
Start: 2023-07-30

## 2023-07-31 ENCOUNTER — TELEPHONE (OUTPATIENT)
Dept: FAMILY MEDICINE CLINIC | Age: 63
End: 2023-07-31

## 2023-07-31 DIAGNOSIS — Z01.811 PRE-OP CHEST EXAM: Primary | ICD-10-CM

## 2023-07-31 LAB
EKG ATRIAL RATE: 69 BPM
EKG P AXIS: 27 DEGREES
EKG P-R INTERVAL: 144 MS
EKG Q-T INTERVAL: 422 MS
EKG QRS DURATION: 76 MS
EKG QTC CALCULATION (BAZETT): 452 MS
EKG R AXIS: -5 DEGREES
EKG T AXIS: 100 DEGREES
EKG VENTRICULAR RATE: 69 BPM

## 2023-07-31 RX ORDER — TIRZEPATIDE 2.5 MG/.5ML
INJECTION, SOLUTION SUBCUTANEOUS
Qty: 4 ADJUSTABLE DOSE PRE-FILLED PEN SYRINGE | Refills: 1 | Status: SHIPPED | OUTPATIENT
Start: 2023-07-31

## 2023-07-31 NOTE — TELEPHONE ENCOUNTER
Patient called into the call center asking for a status update for her Mounjaro. I didn't see that a refill request was sent or anything about a PA so im just sending a refill request.    Please advise.

## 2023-07-31 NOTE — TELEPHONE ENCOUNTER
----- Message from Arie Charles sent at 7/31/2023 12:21 PM EDT -----  Subject: Message to Provider    QUESTIONS  Information for Provider? Pt wants to know if she can come by the office   to  the preop clearance/paperwork on LB desk (mentioned in annual   physical notes from 8/1/23 with PCP Lalit Conrad that has now been r/s to   after surgery on 8/10/23. Pt says she already had her preop done and this   r/s appt with PCP is just the annual physical. Surgery is 8/9/23. Pt is   not sure if she needs to  the paperwork as her surgery is at [address] [place] [address]. Pt would prefer getting the paperwork through a [employer] message   rather than picking it up.  ---------------------------------------------------------------------------  --------------  [address]  [phone]; OK to leave message on voicemail  ---------------------------------------------------------------------------  --------------  SCRIPT ANSWERS  Relationship to Patient?  Self

## 2023-07-31 NOTE — TELEPHONE ENCOUNTER
Patient advised, patient states she will get another chest X-ray. Patient states her cardiologist hasn't reviewed the EKG but she will call their office.

## 2023-07-31 NOTE — TELEPHONE ENCOUNTER
----- Message from Anshul Escobar sent at 7/31/2023 12:21 PM EDT -----  Subject: Message to Provider    QUESTIONS  Information for Provider? Pt wants to know if she can come by the office   to  the preop clearance/paperwork on LB desk (mentioned in annual   physical notes from 8/1/23 with PCP Sky Joiner that has now been r/s to   after surgery on 8/10/23. Pt says she already had her preop done and this   r/s appt with PCP is just the annual physical. Surgery is 8/9/23. Pt is   not sure if she needs to  the paperwork as her surgery is at [address] [place] [address]. Pt would prefer getting the paperwork through a [employer] message   rather than picking it up.  ---------------------------------------------------------------------------  --------------  [address]  [phone]; OK to leave message on voicemail  ---------------------------------------------------------------------------  --------------  SCRIPT ANSWERS  Relationship to Patient?  Self

## 2023-07-31 NOTE — TELEPHONE ENCOUNTER
Recheck chest x-ray ordered to see if her previous abnormal results have resolved since she took the antibiotic. Also EKG appears abnormal has she had clearance from cardiology and have they reviewed this?   Does not need any other blood work at this time

## 2023-08-01 ENCOUNTER — TELEPHONE (OUTPATIENT)
Dept: FAMILY MEDICINE CLINIC | Age: 63
End: 2023-08-01

## 2023-08-01 NOTE — FLOWSHEET NOTE
08/01/23 1614   Anesthesia PAT Clearance   Anesthesia Review Status Marie has reviewed patient for surgery  (Dr. Bonnie Hill reviewed ekg,hx,functional capacity and want chest xray repeat,carotid ultrasound done and pcp and cardiac clearance.  called Sanket and she is aware.)

## 2023-08-02 ENCOUNTER — NURSE ONLY (OUTPATIENT)
Dept: PRIMARY CARE CLINIC | Age: 63
End: 2023-08-02

## 2023-08-02 ENCOUNTER — TELEPHONE (OUTPATIENT)
Dept: FAMILY MEDICINE CLINIC | Age: 63
End: 2023-08-02

## 2023-08-02 DIAGNOSIS — Z01.811 PRE-OP CHEST EXAM: Primary | ICD-10-CM

## 2023-08-04 ENCOUNTER — TELEPHONE (OUTPATIENT)
Dept: FAMILY MEDICINE CLINIC | Age: 63
End: 2023-08-04

## 2023-08-04 NOTE — TELEPHONE ENCOUNTER
Fax to Dr Ruth Michaels office stating patient was cleared but unable to send faxed clearance note until Monday 08/07 as there are no providers in office today.   Script on [employer]

## 2023-08-07 NOTE — TELEPHONE ENCOUNTER
PT WANTED TO LET YOU KNOW HER RHEUMATOLOGIST PUT HER ON PREDNISONE ON Friday. Hydroxyzine Pregnancy And Lactation Text: This medication is not safe during pregnancy and should not be taken. It is also excreted in breast milk and breast feeding isn't recommended.

## 2023-08-09 ENCOUNTER — HOSPITAL ENCOUNTER (OUTPATIENT)
Age: 63
Setting detail: OUTPATIENT SURGERY
Discharge: HOME | End: 2023-08-09
Attending: ORTHOPAEDIC SURGERY | Admitting: ORTHOPAEDIC SURGERY
Payer: COMMERCIAL

## 2023-08-09 ENCOUNTER — ANESTHESIA (OUTPATIENT)
Dept: OPERATING ROOM | Age: 63
End: 2023-08-09
Payer: COMMERCIAL

## 2023-08-09 ENCOUNTER — APPOINTMENT (OUTPATIENT)
Dept: GENERAL RADIOLOGY | Age: 63
End: 2023-08-09
Attending: ORTHOPAEDIC SURGERY
Payer: COMMERCIAL

## 2023-08-09 ENCOUNTER — ANESTHESIA EVENT (OUTPATIENT)
Dept: OPERATING ROOM | Age: 63
End: 2023-08-09
Payer: COMMERCIAL

## 2023-08-09 VITALS
HEIGHT: 66 IN | HEART RATE: 74 BPM | DIASTOLIC BLOOD PRESSURE: 61 MMHG | WEIGHT: 159 LBS | SYSTOLIC BLOOD PRESSURE: 122 MMHG | TEMPERATURE: 96.8 F | BODY MASS INDEX: 25.55 KG/M2 | RESPIRATION RATE: 14 BRPM | OXYGEN SATURATION: 96 %

## 2023-08-09 DIAGNOSIS — M48.54XG PATHOLOGIC COMPRESSION FRACTURE OF THORACIC VERTEBRA WITH DELAYED HEALING: Primary | ICD-10-CM

## 2023-08-09 DIAGNOSIS — M80.08XA CRUSH FRACTURE OF VERTEBRA DUE TO OSTEOPOROSIS, INITIAL ENCOUNTER (HCC): ICD-10-CM

## 2023-08-09 LAB
METER GLUCOSE: 112 MG/DL (ref 65–105)
METER GLUCOSE: 98 MG/DL (ref 65–105)

## 2023-08-09 PROCEDURE — 7100000011 HC PHASE II RECOVERY - ADDTL 15 MIN: Performed by: ORTHOPAEDIC SURGERY

## 2023-08-09 PROCEDURE — 3600000002 HC SURGERY LEVEL 2 BASE: Performed by: ORTHOPAEDIC SURGERY

## 2023-08-09 PROCEDURE — 2500000003 HC RX 250 WO HCPCS: Performed by: ORTHOPAEDIC SURGERY

## 2023-08-09 PROCEDURE — 6360000002 HC RX W HCPCS: Performed by: NURSE ANESTHETIST, CERTIFIED REGISTERED

## 2023-08-09 PROCEDURE — C1894 INTRO/SHEATH, NON-LASER: HCPCS | Performed by: ORTHOPAEDIC SURGERY

## 2023-08-09 PROCEDURE — 3700000000 HC ANESTHESIA ATTENDED CARE: Performed by: ORTHOPAEDIC SURGERY

## 2023-08-09 PROCEDURE — 7100000010 HC PHASE II RECOVERY - FIRST 15 MIN: Performed by: ORTHOPAEDIC SURGERY

## 2023-08-09 PROCEDURE — 2580000003 HC RX 258: Performed by: NURSE ANESTHETIST, CERTIFIED REGISTERED

## 2023-08-09 PROCEDURE — 2500000003 HC RX 250 WO HCPCS: Performed by: NURSE ANESTHETIST, CERTIFIED REGISTERED

## 2023-08-09 PROCEDURE — 3700000001 HC ADD 15 MINUTES (ANESTHESIA): Performed by: ORTHOPAEDIC SURGERY

## 2023-08-09 PROCEDURE — 2580000003 HC RX 258: Performed by: ANESTHESIOLOGY

## 2023-08-09 PROCEDURE — 2720000010 HC SURG SUPPLY STERILE: Performed by: ORTHOPAEDIC SURGERY

## 2023-08-09 PROCEDURE — 3600000012 HC SURGERY LEVEL 2 ADDTL 15MIN: Performed by: ORTHOPAEDIC SURGERY

## 2023-08-09 PROCEDURE — 6360000004 HC RX CONTRAST MEDICATION: Performed by: ORTHOPAEDIC SURGERY

## 2023-08-09 PROCEDURE — 2709999900 HC NON-CHARGEABLE SUPPLY: Performed by: ORTHOPAEDIC SURGERY

## 2023-08-09 PROCEDURE — 6360000002 HC RX W HCPCS: Performed by: ORTHOPAEDIC SURGERY

## 2023-08-09 PROCEDURE — 88311 DECALCIFY TISSUE: CPT

## 2023-08-09 PROCEDURE — C1713 ANCHOR/SCREW BN/BN,TIS/BN: HCPCS | Performed by: ORTHOPAEDIC SURGERY

## 2023-08-09 PROCEDURE — 7100000000 HC PACU RECOVERY - FIRST 15 MIN: Performed by: ORTHOPAEDIC SURGERY

## 2023-08-09 PROCEDURE — 82947 ASSAY GLUCOSE BLOOD QUANT: CPT

## 2023-08-09 PROCEDURE — 88342 IMHCHEM/IMCYTCHM 1ST ANTB: CPT

## 2023-08-09 PROCEDURE — C9399 UNCLASSIFIED DRUGS OR BIOLOG: HCPCS | Performed by: NURSE ANESTHETIST, CERTIFIED REGISTERED

## 2023-08-09 PROCEDURE — 88307 TISSUE EXAM BY PATHOLOGIST: CPT

## 2023-08-09 PROCEDURE — 7100000001 HC PACU RECOVERY - ADDTL 15 MIN: Performed by: ORTHOPAEDIC SURGERY

## 2023-08-09 PROCEDURE — 88341 IMHCHEM/IMCYTCHM EA ADD ANTB: CPT

## 2023-08-09 DEVICE — CEMENT C01A KYPHX HV-R BONE CEMENT EN
Type: IMPLANTABLE DEVICE | Site: BACK | Status: FUNCTIONAL
Brand: KYPHON® HV-R® BONE CEMENT

## 2023-08-09 RX ORDER — OXYCODONE HYDROCHLORIDE 5 MG/1
5 TABLET ORAL
Status: DISCONTINUED | OUTPATIENT
Start: 2023-08-09 | End: 2023-08-09 | Stop reason: HOSPADM

## 2023-08-09 RX ORDER — HYDROCODONE BITARTRATE AND ACETAMINOPHEN 5; 325 MG/1; MG/1
1 TABLET ORAL EVERY 4 HOURS PRN
Qty: 42 TABLET | Refills: 0 | Status: SHIPPED | OUTPATIENT
Start: 2023-08-09 | End: 2023-08-16

## 2023-08-09 RX ORDER — ONDANSETRON 2 MG/ML
INJECTION INTRAMUSCULAR; INTRAVENOUS PRN
Status: DISCONTINUED | OUTPATIENT
Start: 2023-08-09 | End: 2023-08-09 | Stop reason: SDUPTHER

## 2023-08-09 RX ORDER — ONDANSETRON 2 MG/ML
4 INJECTION INTRAMUSCULAR; INTRAVENOUS
Status: DISCONTINUED | OUTPATIENT
Start: 2023-08-09 | End: 2023-08-09 | Stop reason: HOSPADM

## 2023-08-09 RX ORDER — PROPOFOL 10 MG/ML
INJECTION, EMULSION INTRAVENOUS PRN
Status: DISCONTINUED | OUTPATIENT
Start: 2023-08-09 | End: 2023-08-09 | Stop reason: SDUPTHER

## 2023-08-09 RX ORDER — SODIUM CHLORIDE, SODIUM LACTATE, POTASSIUM CHLORIDE, CALCIUM CHLORIDE 600; 310; 30; 20 MG/100ML; MG/100ML; MG/100ML; MG/100ML
INJECTION, SOLUTION INTRAVENOUS CONTINUOUS PRN
Status: DISCONTINUED | OUTPATIENT
Start: 2023-08-09 | End: 2023-08-09 | Stop reason: SDUPTHER

## 2023-08-09 RX ORDER — CLINDAMYCIN PHOSPHATE 900 MG/50ML
900 INJECTION INTRAVENOUS ONCE
Status: COMPLETED | OUTPATIENT
Start: 2023-08-09 | End: 2023-08-09

## 2023-08-09 RX ORDER — ROCURONIUM BROMIDE 10 MG/ML
INJECTION, SOLUTION INTRAVENOUS PRN
Status: DISCONTINUED | OUTPATIENT
Start: 2023-08-09 | End: 2023-08-09 | Stop reason: SDUPTHER

## 2023-08-09 RX ORDER — DIPHENHYDRAMINE HYDROCHLORIDE 50 MG/ML
12.5 INJECTION INTRAMUSCULAR; INTRAVENOUS
Status: DISCONTINUED | OUTPATIENT
Start: 2023-08-09 | End: 2023-08-09 | Stop reason: HOSPADM

## 2023-08-09 RX ORDER — FENTANYL CITRATE 50 UG/ML
INJECTION, SOLUTION INTRAMUSCULAR; INTRAVENOUS PRN
Status: DISCONTINUED | OUTPATIENT
Start: 2023-08-09 | End: 2023-08-09 | Stop reason: SDUPTHER

## 2023-08-09 RX ORDER — MIDAZOLAM HYDROCHLORIDE 1 MG/ML
INJECTION INTRAMUSCULAR; INTRAVENOUS PRN
Status: DISCONTINUED | OUTPATIENT
Start: 2023-08-09 | End: 2023-08-09 | Stop reason: SDUPTHER

## 2023-08-09 RX ORDER — LIDOCAINE HYDROCHLORIDE 20 MG/ML
INJECTION, SOLUTION EPIDURAL; INFILTRATION; INTRACAUDAL; PERINEURAL PRN
Status: DISCONTINUED | OUTPATIENT
Start: 2023-08-09 | End: 2023-08-09 | Stop reason: SDUPTHER

## 2023-08-09 RX ORDER — BUPIVACAINE HYDROCHLORIDE AND EPINEPHRINE 5; 5 MG/ML; UG/ML
INJECTION, SOLUTION EPIDURAL; INTRACAUDAL; PERINEURAL PRN
Status: DISCONTINUED | OUTPATIENT
Start: 2023-08-09 | End: 2023-08-09 | Stop reason: ALTCHOICE

## 2023-08-09 RX ORDER — SODIUM CHLORIDE, SODIUM LACTATE, POTASSIUM CHLORIDE, CALCIUM CHLORIDE 600; 310; 30; 20 MG/100ML; MG/100ML; MG/100ML; MG/100ML
INJECTION, SOLUTION INTRAVENOUS CONTINUOUS
Status: DISCONTINUED | OUTPATIENT
Start: 2023-08-09 | End: 2023-08-09 | Stop reason: HOSPADM

## 2023-08-09 RX ORDER — FENTANYL CITRATE 50 UG/ML
25 INJECTION, SOLUTION INTRAMUSCULAR; INTRAVENOUS EVERY 5 MIN PRN
Status: DISCONTINUED | OUTPATIENT
Start: 2023-08-09 | End: 2023-08-09 | Stop reason: HOSPADM

## 2023-08-09 RX ORDER — DEXAMETHASONE SODIUM PHOSPHATE 10 MG/ML
INJECTION, SOLUTION INTRAMUSCULAR; INTRAVENOUS PRN
Status: DISCONTINUED | OUTPATIENT
Start: 2023-08-09 | End: 2023-08-09 | Stop reason: SDUPTHER

## 2023-08-09 RX ORDER — FENTANYL CITRATE 50 UG/ML
50 INJECTION, SOLUTION INTRAMUSCULAR; INTRAVENOUS EVERY 5 MIN PRN
Status: DISCONTINUED | OUTPATIENT
Start: 2023-08-09 | End: 2023-08-09 | Stop reason: HOSPADM

## 2023-08-09 RX ORDER — ZOLPIDEM TARTRATE 5 MG/1
5 TABLET ORAL NIGHTLY PRN
COMMUNITY

## 2023-08-09 RX ADMIN — CLINDAMYCIN PHOSPHATE 900 MG: 900 INJECTION, SOLUTION INTRAVENOUS at 07:50

## 2023-08-09 RX ADMIN — MIDAZOLAM 2 MG: 1 INJECTION INTRAMUSCULAR; INTRAVENOUS at 07:33

## 2023-08-09 RX ADMIN — DEXAMETHASONE SODIUM PHOSPHATE 10 MG: 10 INJECTION, SOLUTION INTRAMUSCULAR; INTRAVENOUS at 07:50

## 2023-08-09 RX ADMIN — LIDOCAINE HYDROCHLORIDE 60 MG: 20 INJECTION, SOLUTION EPIDURAL; INFILTRATION; INTRACAUDAL; PERINEURAL at 07:37

## 2023-08-09 RX ADMIN — FENTANYL CITRATE 100 MCG: 50 INJECTION INTRAMUSCULAR; INTRAVENOUS at 07:37

## 2023-08-09 RX ADMIN — PROPOFOL 150 MG: 10 INJECTION, EMULSION INTRAVENOUS at 07:37

## 2023-08-09 RX ADMIN — SODIUM CHLORIDE, POTASSIUM CHLORIDE, SODIUM LACTATE AND CALCIUM CHLORIDE: 600; 310; 30; 20 INJECTION, SOLUTION INTRAVENOUS at 07:33

## 2023-08-09 RX ADMIN — ONDANSETRON 4 MG: 2 INJECTION INTRAMUSCULAR; INTRAVENOUS at 08:10

## 2023-08-09 RX ADMIN — SUGAMMADEX 200 MG: 100 INJECTION, SOLUTION INTRAVENOUS at 08:18

## 2023-08-09 RX ADMIN — ROCURONIUM BROMIDE 50 MG: 10 INJECTION, SOLUTION INTRAVENOUS at 07:37

## 2023-08-09 RX ADMIN — SODIUM CHLORIDE, POTASSIUM CHLORIDE, SODIUM LACTATE AND CALCIUM CHLORIDE: 600; 310; 30; 20 INJECTION, SOLUTION INTRAVENOUS at 06:27

## 2023-08-09 ASSESSMENT — ENCOUNTER SYMPTOMS: SHORTNESS OF BREATH: 0

## 2023-08-09 ASSESSMENT — PAIN - FUNCTIONAL ASSESSMENT: PAIN_FUNCTIONAL_ASSESSMENT: 0-10

## 2023-08-09 NOTE — DISCHARGE INSTRUCTIONS
No bending or twisting back for 1 week  No lifting over 15 pounds for 1 week  Remove Dry dressing in 3 days  Stiches are dissolvable and do not need to be removed  Call for any fevers, wound redness, swelling or drainage after 4 days [phone]  May shower in 3 days        MD [facility]  Spine Surgeon  [phone]

## 2023-08-09 NOTE — ANESTHESIA POSTPROCEDURE EVALUATION
Department of Anesthesiology  Postprocedure Note    Patient: Dianelys Bey  MRN: [id number]  YOB: 1960  Date of evaluation: 8/9/2023      Procedure Summary     Date: 08/09/23 Room / Location: [address] - INPATIENT    Anesthesia Start: 1937 Anesthesia Stop: 3923    Procedure: T12 KYPHOPLASTY WITH BIOPSY (Back) Diagnosis:       Crush fracture of vertebra due to osteoporosis, initial encounter ([address])      (Crush fracture of vertebra due to osteoporosis, initial encounter ([address]) [place])    Surgeons: Moises Fleming MD Responsible Provider: Lizbeth Johnson MD    Anesthesia Type: General ASA Status: 4          Anesthesia Type: General    Stacy Phase I: Stacy Score: 10    Stacy Phase II: Stacy Score: 10      Anesthesia Post Evaluation    Complications: no

## 2023-08-09 NOTE — ANESTHESIA PRE PROCEDURE
Department of Anesthesiology  Preprocedure Note       Name:  Antonio Brown   Age:  66 y.o.  :  1960                                          MRN:  [id number]         Date:  2023      Surgeon: Sabra Henning):  Rosemary Devine MD    Procedure: Procedure(s):  T12 KYPHOPLASTY WITH BIOPSY    Medications prior to admission:   Prior to Admission medications    Medication Sig Start Date End Date Taking? Authorizing Provider   zolpidem (AMBIEN) 5 MG tablet Take 1 tablet by mouth nightly as needed for Sleep. Max Daily Amount: 5 mg   Yes Historical Provider, MD   Tirzepatide (MOUNJARO) 2.5 MG/0.5ML SOPN SC injection Inject 2.5mg once weekly as directed.  23   Indu TYLER Paez - CNP   insulin lispro, 1 Unit Dial, (HUMALOG KWIKPEN) 100 UNIT/ML SOPN INJECT 20 UNITS UNDER THE SKIN PLUS SLIDING SCALE BEFORE MEALS THREE TIMES DAILY MAX DAILY DOSE IS 90 UNITS 23   Indu TYLER Paez - CNP   cyclobenzaprine (FLEXERIL) 10 MG tablet Take 1 tablet by mouth 3 times daily as needed for Muscle spasms  Patient taking differently: Take 1 tablet by mouth 2 times daily as needed for Muscle spasms 7/17/23 9/15/23  TYLER Herron - CNP   TOUJEO SOLOSTAR 300 UNIT/ML concentrated injection pen ADMINISTER UP  UNITS UNDER THE SKIN EVERY DAY AS DIRECTED  Patient taking differently: Inject into the skin nightly ADMINISTER UP  UNITS UNDER THE SKIN EVERY DAY AS DIRECTED 23   Indu TYLER Paez - CNP   Continuous Blood Gluc Sensor (FREESTYLE JUHI 3 SENSOR) MISC CHANGE SENSOR EVERY 14 DAYS 23   Indu Ing APRN - CNP   venlafaxine (EFFEXOR XR) 75 MG extended release capsule TAKE ONE CAPSULE BY MOUTH EVERY DAY  Patient taking differently: 1 capsule daily TAKE ONE CAPSULE BY MOUTH EVERY DAY 23   Indu Philippe APRN - CNP   Probiotic Product (PROBIOTIC BLEND PO) Take 1 tablet by mouth daily    Historical Provider, MD   estradiol (ESTRACE) 0.1 MG/GM vaginal cream 2 g daily 10/28/22

## 2023-08-10 ENCOUNTER — OFFICE VISIT (OUTPATIENT)
Dept: FAMILY MEDICINE CLINIC | Age: 63
End: 2023-08-10
Payer: COMMERCIAL

## 2023-08-10 VITALS
HEART RATE: 75 BPM | OXYGEN SATURATION: 96 % | SYSTOLIC BLOOD PRESSURE: 114 MMHG | DIASTOLIC BLOOD PRESSURE: 80 MMHG | HEIGHT: 66 IN | BODY MASS INDEX: 25.2 KG/M2 | TEMPERATURE: 97.1 F | WEIGHT: 156.8 LBS

## 2023-08-10 DIAGNOSIS — Z98.890 S/P KYPHOPLASTY: ICD-10-CM

## 2023-08-10 DIAGNOSIS — M54.50 LOW BACK PAIN WITHOUT SCIATICA, UNSPECIFIED BACK PAIN LATERALITY, UNSPECIFIED CHRONICITY: ICD-10-CM

## 2023-08-10 DIAGNOSIS — I65.29 STENOSIS OF CAROTID ARTERY, UNSPECIFIED LATERALITY: Primary | ICD-10-CM

## 2023-08-10 LAB — SURGICAL PATHOLOGY REPORT: NORMAL

## 2023-08-10 PROCEDURE — 99213 OFFICE O/P EST LOW 20 MIN: CPT | Performed by: NURSE PRACTITIONER

## 2023-08-10 RX ORDER — ASPIRIN 81 MG/1
81 TABLET ORAL DAILY
Qty: 90 TABLET | Refills: 1 | Status: SHIPPED | OUTPATIENT
Start: 2023-08-10

## 2023-08-10 RX ORDER — CYCLOBENZAPRINE HCL 10 MG
10 TABLET ORAL 2 TIMES DAILY PRN
COMMUNITY
Start: 2023-07-11

## 2023-08-10 ASSESSMENT — ENCOUNTER SYMPTOMS
ABDOMINAL PAIN: 0
COUGH: 0
BACK PAIN: 1
NAUSEA: 0
DIARRHEA: 0
SHORTNESS OF BREATH: 0
VOMITING: 0
SORE THROAT: 0
SINUS PAIN: 0

## 2023-08-10 NOTE — OP NOTE
Operative Note      Patient: Christine Thomason  YOB: 1960  MRN: [id number]    Date of Procedure: 8/9/2023    SURGEON:  Stanford Toth M.D. ANESTHESIA:  General endotracheal anesthesia. PREOPERATIVE DIAGNOSIS:  1. Acute T12 pathologic vertebral compression fracture. 2. Senile osteoporosis. POSTOPERATIVE DIAGNOSIS:  1. Acute T12 pathologic vertebral compression fracture. 2. Senile osteoporosis. PROCEDURES:  1. T12 vertebral augmentation utilizing kyphoplasty technique. 2. T12 vertebral biopsy. 3. Intraoperative use of C-arm fluoroscopy. ESTIMATED BLOOD LOSS:  5 mL. FLUIDS:  Per anesthesia record. COMPLICATIONS:  None. INDICATIONS:  This is a 53-year-old female with a history of fall  and new onset low back pain, attempted conservative management  with activity modification, pain management all with minimal  benefit who is having difficulty with daily activities and  increasing pain due to the fact that she has failed conservative  management was discussed with her the option of performing  kyphoplasty procedure in an attempt to help alleviate her  symptoms. Risks were discussed including bleeding, infection,  injury to nerves, vessels, anesthetic risk, need for possible  further future surgery as well as the possibility for continued  pain, continued symptoms and possibility further fractures. She  did understand all these risks and wished to proceed, informed  consent was obtained. DESCRIPTION OF PROCEDURE:  The patient was taken to the operating  room, kept supine on her bed. She was intubated, placed under  general anesthesia by the anesthesiologist.  She was given  preoperative antibiotic prophylaxis. She was then placed prone  on the [place] table. All bony prominences were padded. Eyes  were kept free of any pressure, brachial plexus, and elbows were  padded as well.   The biplanar C-arm fluoroscopy was then brought  in and localized over the T12 vertebrae and the pedicles were then  marked at T12 as well as the entry point just lateral to the  pedicles bilateral.  At this point, the back was then prepped and  draped in a sterile fashion, 0.5% Marcaine with epinephrine was  infiltrated in the skin and subcutaneous tissue. Small stab  incisions were made lateral to the pedicles bilaterally at T12. Jamshidi needle was then placed on to these openings and Jamshidi  was placed lateral to the pedicle and positioned with Biplanar C-  arm fluoroscopy. Biplanar fluoroscopy was then used to place the  bilateral Jamshidi needles through the T12 pedicles bilaterally  and in to the vertebral body bilaterally. At this point, biopsy  was obtained and this was sent to pathology. Hand-drill was then  used followed by placement of the balloons. The balloons were  then inflated, which did show some reduction of the fracture. The cement was then mixed, balloons were deflated and then were  removed, and the cement was then placed into the T12 vertebral  body bilaterally until there was excellent cement fill in both  the PA and lateral views. Once the cement has hardened, the  instrumentation was removed. Final pictures were taken, which  did show excellent fill in both the AP and lateral views and at  the T12 vertebral body. The wounds were then irrigated with  sterile normal saline, followed by closure with of 3-0 and  placement of Dermabond glue. Standard dressings were then  applied. She was then placed supine on the bed, extubated and  taken to recovery room in stable condition.       Electronically signed by Debora Reid MD on 8/9/2023 at 10:02 PM

## 2023-08-10 NOTE — PROGRESS NOTES
Visit Information    Have you changed or started any medications since your last visit including any over-the-counter medicines, vitamins, or herbal medicines? no   Have you stopped taking any of your medications? Is so, why? -  no  Are you having any side effects from any of your medications? - no    Have you seen any other physician or provider since your last visit?  no   Have you had any other diagnostic tests since your last visit?  no   Have you been seen in the emergency room and/or had an admission in a hospital since we last saw you?  no   Have you had your routine dental cleaning in the past 6 months?  no     Do you have an active MyChart account? If no, what is the barrier?   Yes    Patient Care Team:  TYLER Villatoro CNP as PCP - General (Family Nurse Practitioner)  TYLER Villatoro CNP as PCP - Empaneled Provider    Medical History Review  Past Medical, Family, and Social History reviewed and  contribute to the patient presenting condition    Health Maintenance   Topic Date Due    Diabetic foot exam  Never done    Diabetic retinal exam  Never done    COVID-19 Vaccine (4 - Booster for Lamar Cull series) 01/01/2022    Flu vaccine (1) 08/01/2023    Cervical cancer screen  04/17/2025 (Originally 4/18/1981)    Breast cancer screen  10/05/2023    Diabetic Alb to Cr ratio (uACR) test  01/24/2024    Depression Screen  01/30/2024    A1C test (Diabetic or Prediabetic)  05/11/2024    Lipids  06/27/2024    GFR test (Diabetes, CKD 3-4, OR last GFR 15-59)  07/28/2024    Pneumococcal 0-64 years Vaccine (3 - PPSV23 if available, else PCV20) 04/18/2027    DTaP/Tdap/Td vaccine (2 - Td or Tdap) 01/09/2030    Colorectal Cancer Screen  04/14/2032    Shingles vaccine  Completed    Hepatitis C screen  Completed    HIV screen  Completed    Hepatitis A vaccine  Aged Out    Hib vaccine  Aged Out    Meningococcal (ACWY) vaccine  Aged Out    Diabetes screen  Discontinued
Fract* 06/27/2023 13.1 (H)     NRBC Automated 06/27/2023 0.0     Glucose 06/27/2023 210 (H)     BUN 06/27/2023 22     Creatinine 06/27/2023 0.84     Est, Glom Filt Rate 06/27/2023 >60     Calcium 06/27/2023 10.9 (H)     Sodium 06/27/2023 139     Potassium 06/27/2023 3.6 (L)     Chloride 06/27/2023 99     CO2 06/27/2023 17 (L)     Anion Gap 06/27/2023 23 (H)     Alkaline Phosphatase 06/27/2023 156 (H)     ALT 06/27/2023 45 (H)     AST 06/27/2023 26     Total Bilirubin 06/27/2023 0.8     Total Protein 06/27/2023 7.6     Albumin 06/27/2023 4.4     Albumin/Globulin Ratio 06/27/2023 1.4     TSH 06/27/2023 2.30     Cholesterol 06/27/2023 115     HDL 06/27/2023 40 (L)     LDL Cholesterol 06/27/2023 51     Chol/HDL Ratio 06/27/2023 2.9     Triglycerides 06/27/2023 120     Iron 06/27/2023 59     Vit D, 25-Hydroxy 06/27/2023 45.8        Assessment and Plan      1. Stenosis of carotid artery, unspecified laterality  -     aspirin 81 MG EC tablet; Take 1 tablet by mouth daily, Disp-90 tablet, R-1Normal  2. Low back pain without sciatica, unspecified back pain laterality, unspecified chronicity  -     Handicap Placard [facility]; Starting Thu 8/10/2023, Disp-1 each, R-0, PrintDuration: lifetime  -     [facility] and [facility]  3. S/P kyphoplasty  -     Handicap Placard [facility]; Starting Thu 8/10/2023, Disp-1 each, R-0, PrintDuration: lifetime  -     [facility] and [facility] for handicap placard    Refill on aspirin    Recommend pm and r for follow up after cleared from kyphoplasty            No results found for this visit on 08/10/23. Return if symptoms worsen or fail to improve.         Orders Placed This Encounter   Medications    aspirin 81 MG EC tablet     Sig: Take 1 tablet by mouth daily     Dispense:  90 tablet     Refill:  1    Handicap Placard MISC     Sig: by Does not apply route Duration: lifetime     Dispense:  1 each     Refill:  0        Patient given

## 2023-08-16 ENCOUNTER — APPOINTMENT (OUTPATIENT)
Dept: PHARMACY | Age: 63
End: 2023-08-16
Payer: COMMERCIAL

## 2023-08-30 ENCOUNTER — HOSPITAL ENCOUNTER (OUTPATIENT)
Dept: PHARMACY | Age: 63
Setting detail: THERAPIES SERIES
Discharge: HOME | End: 2023-08-30
Payer: COMMERCIAL

## 2023-08-30 VITALS
TEMPERATURE: 97.1 F | WEIGHT: 151.4 LBS | SYSTOLIC BLOOD PRESSURE: 81 MMHG | DIASTOLIC BLOOD PRESSURE: 53 MMHG | OXYGEN SATURATION: 97 % | HEART RATE: 78 BPM | BODY MASS INDEX: 24.44 KG/M2

## 2023-08-30 LAB — HBA1C MFR BLD: 6.9 %

## 2023-08-30 PROCEDURE — 83036 HEMOGLOBIN GLYCOSYLATED A1C: CPT

## 2023-08-30 PROCEDURE — 99212 OFFICE O/P EST SF 10 MIN: CPT

## 2023-08-30 RX ORDER — HYDROCODONE BITARTRATE AND ACETAMINOPHEN 5; 325 MG/1; MG/1
TABLET ORAL
COMMUNITY
Start: 2023-08-22

## 2023-08-30 NOTE — PROGRESS NOTES
Diabetic Medication Management Program  Formerly [facility]. [address]  Phone: [phone]  Fax: [phone]    NAME: Prince Hoyos  MEDICAL RECORD NUMBER:  [id number]  AGE: 66 y.o. GENDER: female  : 1960  EPISODE DATE:  2023     Ms. Hoyos was referred to [facility] Medication Management Services Lincoln Peabody, CNP. Patient acknowledges working in consult agreement with clinical pharmacist and this provider. Goals per referral:   Fasting blood glucose: < 130  Peak postprandial glucose: < 180  A1C: < 7    SUBJECTIVE     Ms. Sigrid Castellon is a 66 y.o. female here for the Diabetes Service for self-management education, medication review including over the counter medications and herbal products, overall wellbeing assessment, transition of care and any needed adjustments with updates and recommendations communicated to the referring physician. Patient Findings:   Medication changes  Yes: Stopped Mounjaro for a few weeks but has been back on the 2.5mg dose for 2 weeks at this point. Diet changes  no  Activity changes  no  Emergency Room Visit or Hospitalization  Yes: Had surgery since she was here last  Acute Illness/new problems  no  Symptoms of hypoglycemia  yes -  feels off  Symptoms of hyperglycemia  no - none  Medication adverse reactions  none    OBJECTIVE     PMHx:    Past Medical History:   Diagnosis Date    CAD (coronary artery disease)     H/o CABGx2.  Sees [facility]    Colostomy in place [facility])     Crohn's disease ([address])     Frequent UTI     History of blood transfusion     Neuropathy     down to knees bilat    Osteopenia of multiple sites     PONV (postoperative nausea and vomiting)     Psoriatic arthritis ([address])     Type 2 diabetes mellitus without complication ([address])     since pregnancy, 17 years ago       Social History:    Social History     Tobacco Use    Smoking status: Never    Smokeless tobacco: Never   Substance Use Topics

## 2023-09-01 RX ORDER — VENLAFAXINE HYDROCHLORIDE 75 MG/1
CAPSULE, EXTENDED RELEASE ORAL
Qty: 90 CAPSULE | Refills: 1 | Status: SHIPPED | OUTPATIENT
Start: 2023-09-01

## 2023-09-11 ENCOUNTER — OFFICE VISIT (OUTPATIENT)
Dept: FAMILY MEDICINE CLINIC | Age: 63
End: 2023-09-11
Payer: COMMERCIAL

## 2023-09-11 VITALS
TEMPERATURE: 96.9 F | HEART RATE: 92 BPM | WEIGHT: 148.2 LBS | SYSTOLIC BLOOD PRESSURE: 108 MMHG | BODY MASS INDEX: 23.82 KG/M2 | OXYGEN SATURATION: 97 % | HEIGHT: 66 IN | DIASTOLIC BLOOD PRESSURE: 60 MMHG

## 2023-09-11 DIAGNOSIS — E83.42 HYPOMAGNESEMIA: ICD-10-CM

## 2023-09-11 DIAGNOSIS — Z12.31 ENCOUNTER FOR SCREENING MAMMOGRAM FOR MALIGNANT NEOPLASM OF BREAST: ICD-10-CM

## 2023-09-11 DIAGNOSIS — R55 SYNCOPE, UNSPECIFIED SYNCOPE TYPE: ICD-10-CM

## 2023-09-11 DIAGNOSIS — E04.2 MULTIPLE THYROID NODULES: Primary | ICD-10-CM

## 2023-09-11 DIAGNOSIS — E87.6 HYPOKALEMIA: ICD-10-CM

## 2023-09-11 DIAGNOSIS — E83.52 SERUM CALCIUM ELEVATED: ICD-10-CM

## 2023-09-11 PROCEDURE — 99214 OFFICE O/P EST MOD 30 MIN: CPT | Performed by: NURSE PRACTITIONER

## 2023-09-11 ASSESSMENT — ENCOUNTER SYMPTOMS
ABDOMINAL PAIN: 0
COUGH: 0
CHEST TIGHTNESS: 0
NAUSEA: 0
VOMITING: 0
SHORTNESS OF BREATH: 0

## 2023-09-11 NOTE — PROGRESS NOTES
Visit Information    Have you changed or started any medications since your last visit including any over-the-counter medicines, vitamins, or herbal medicines? no   Have you stopped taking any of your medications? Is so, why? -  no  Are you having any side effects from any of your medications? - no    Have you seen any other physician or provider since your last visit?  no   Have you had any other diagnostic tests since your last visit?  no   Have you been seen in the emergency room and/or had an admission in a hospital since we last saw you?  no   Have you had your routine dental cleaning in the past 6 months?  no     Do you have an active MyChart account? If no, what is the barrier?   Yes    Patient Care Team:  TYLER Leon CNP as PCP - General (Family Nurse Practitioner)  TYLER Leon CNP as PCP - Empaneled Provider    Medical History Review  Past Medical, Family, and Social History reviewed and  contribute to the patient presenting condition    Health Maintenance   Topic Date Due    Diabetic foot exam  Never done    Diabetic retinal exam  Never done    COVID-19 Vaccine (4 - Moderna series) 01/01/2022    Hepatitis B vaccine (1 of 3 - Risk 3-dose series) Never done    Flu vaccine (1) 08/01/2023    Breast cancer screen  10/05/2023    Cervical cancer screen  04/17/2025 (Originally 4/18/1981)    Diabetic Alb to Cr ratio (uACR) test  01/24/2024    Depression Screen  01/30/2024    Lipids  06/27/2024    GFR test (Diabetes, CKD 3-4, OR last GFR 15-59)  07/28/2024    A1C test (Diabetic or Prediabetic)  08/30/2024    Pneumococcal 0-64 years Vaccine (3 - PPSV23 or PCV20) 04/18/2027    DTaP/Tdap/Td vaccine (2 - Td or Tdap) 01/09/2030    Colorectal Cancer Screen  04/14/2032    Shingles vaccine  Completed    Hepatitis C screen  Completed    HIV screen  Completed    Hepatitis A vaccine  Aged Out    Hib vaccine  Aged Out    Meningococcal (ACWY) vaccine  Aged Out    Diabetes screen  Discontinued
improve. Orders Placed This Encounter   Procedures    US THYROID     This procedure can be scheduled via [employer]. Access your [facility] account by visiting [email or web address]. Standing Status:   Future     Standing Expiration Date:   9/11/2024     Order Specific Question:   Reason for exam:     Answer:   eval mx nodules    ARTHUR DIGITAL SCREEN W OR WO CAD BILATERAL     Standing Status:   Future     Standing Expiration Date:   11/11/2024     Order Specific Question:   Reason for exam:     Answer:   screen    PTH, Intact     Standing Status:   Future     Standing Expiration Date:   9/11/2024    Vitamin D 25 Hydroxy     Standing Status:   Future     Standing Expiration Date:   9/10/2024    Potassium     Standing Status:   Future     Standing Expiration Date:   9/11/2024    Magnesium     Standing Status:   Future     Standing Expiration Date:   9/11/2024     Check thyroid US for new nodules and possible parathyroid adenoma  Check other labs for causes of high calcium also  Recheck KCL/MAg since replacement, but she was already on 500 mg magnesium daily ( did have part of colon removed in past)  Will call with test results  Continue with plan for neuro/cardio appt upcoming  Continue to avoid BB for now and continue other meds as prescribed    Schd. Mamm when able      Patient given educational materials - see patient instructions. Discussed use,benefit, and side effects of prescribed medications. All patient questions answered. Pt voiced understanding. Reviewed health maintenance. Instructed to continue currentmedications, diet and exercise.     Electronically signed by TYLER Elmore CNP, CNP on 9/11/2023 at 5:37 PM

## 2023-09-18 ENCOUNTER — HOSPITAL ENCOUNTER (OUTPATIENT)
Facility: CLINIC | Age: 63
Discharge: HOME | End: 2023-09-18
Payer: COMMERCIAL

## 2023-09-18 ENCOUNTER — HOSPITAL ENCOUNTER (OUTPATIENT)
Dept: ULTRASOUND IMAGING | Facility: CLINIC | Age: 63
Discharge: HOME | End: 2023-09-20
Payer: COMMERCIAL

## 2023-09-18 DIAGNOSIS — E83.52 SERUM CALCIUM ELEVATED: ICD-10-CM

## 2023-09-18 DIAGNOSIS — E83.42 HYPOMAGNESEMIA: ICD-10-CM

## 2023-09-18 DIAGNOSIS — E04.2 MULTIPLE THYROID NODULES: ICD-10-CM

## 2023-09-18 DIAGNOSIS — E87.6 HYPOKALEMIA: ICD-10-CM

## 2023-09-18 LAB — PTH-INTACT SERPL-MCNC: 41 PG/ML (ref 15–65)

## 2023-09-18 PROCEDURE — 82306 VITAMIN D 25 HYDROXY: CPT

## 2023-09-18 PROCEDURE — 83735 ASSAY OF MAGNESIUM: CPT

## 2023-09-18 PROCEDURE — 36415 COLL VENOUS BLD VENIPUNCTURE: CPT

## 2023-09-18 PROCEDURE — 83970 ASSAY OF PARATHORMONE: CPT

## 2023-09-18 PROCEDURE — 84132 ASSAY OF SERUM POTASSIUM: CPT

## 2023-09-18 PROCEDURE — 76536 US EXAM OF HEAD AND NECK: CPT

## 2023-09-19 DIAGNOSIS — G47.00 INSOMNIA, UNSPECIFIED TYPE: Primary | ICD-10-CM

## 2023-09-19 LAB
25(OH)D3 SERPL-MCNC: 41.8 NG/ML
MAGNESIUM SERPL-MCNC: 2.2 MG/DL (ref 1.6–2.6)
POTASSIUM SERPL-SCNC: 3.7 MMOL/L (ref 3.7–5.3)

## 2023-09-20 DIAGNOSIS — E04.1 THYROID NODULE: Primary | ICD-10-CM

## 2023-09-20 RX ORDER — ZOLPIDEM TARTRATE 5 MG/1
TABLET ORAL
Qty: 60 TABLET | Refills: 0 | Status: SHIPPED | OUTPATIENT
Start: 2023-09-20 | End: 2023-10-20

## 2023-09-22 ENCOUNTER — TELEPHONE (OUTPATIENT)
Dept: INTERVENTIONAL RADIOLOGY/VASCULAR | Age: 63
End: 2023-09-22

## 2023-10-09 RX ORDER — INSULIN GLARGINE 300 U/ML
INJECTION, SOLUTION SUBCUTANEOUS
Qty: 5 ML | Refills: 3 | Status: SHIPPED | OUTPATIENT
Start: 2023-10-09

## 2023-10-15 ENCOUNTER — HOSPITAL ENCOUNTER (EMERGENCY)
Facility: CLINIC | Age: 63
Discharge: HOME | End: 2023-10-15
Attending: EMERGENCY MEDICINE
Payer: COMMERCIAL

## 2023-10-15 VITALS
WEIGHT: 145 LBS | HEIGHT: 66 IN | TEMPERATURE: 97.7 F | RESPIRATION RATE: 18 BRPM | OXYGEN SATURATION: 98 % | HEART RATE: 95 BPM | DIASTOLIC BLOOD PRESSURE: 78 MMHG | SYSTOLIC BLOOD PRESSURE: 120 MMHG | BODY MASS INDEX: 23.3 KG/M2

## 2023-10-15 DIAGNOSIS — K08.89 DENTALGIA: Primary | ICD-10-CM

## 2023-10-15 PROCEDURE — 6360000002 HC RX W HCPCS: Performed by: EMERGENCY MEDICINE

## 2023-10-15 PROCEDURE — 99283 EMERGENCY DEPT VISIT LOW MDM: CPT

## 2023-10-15 PROCEDURE — 6370000000 HC RX 637 (ALT 250 FOR IP): Performed by: EMERGENCY MEDICINE

## 2023-10-15 RX ORDER — ONDANSETRON 4 MG/1
4 TABLET, ORALLY DISINTEGRATING ORAL ONCE
Status: COMPLETED | OUTPATIENT
Start: 2023-10-15 | End: 2023-10-15

## 2023-10-15 RX ORDER — DEXAMETHASONE 4 MG/1
4 TABLET ORAL ONCE
Status: COMPLETED | OUTPATIENT
Start: 2023-10-15 | End: 2023-10-15

## 2023-10-15 RX ORDER — HYDROCODONE BITARTRATE AND ACETAMINOPHEN 5; 325 MG/1; MG/1
1 TABLET ORAL ONCE
Status: COMPLETED | OUTPATIENT
Start: 2023-10-15 | End: 2023-10-15

## 2023-10-15 RX ORDER — ONDANSETRON 4 MG/1
4 TABLET, ORALLY DISINTEGRATING ORAL 3 TIMES DAILY PRN
Qty: 21 TABLET | Refills: 0 | Status: SHIPPED | OUTPATIENT
Start: 2023-10-15

## 2023-10-15 RX ORDER — HYDROCODONE BITARTRATE AND ACETAMINOPHEN 5; 325 MG/1; MG/1
1 TABLET ORAL EVERY 6 HOURS PRN
Qty: 10 TABLET | Refills: 0 | Status: SHIPPED | OUTPATIENT
Start: 2023-10-15 | End: 2023-10-18

## 2023-10-15 RX ADMIN — DEXAMETHASONE 4 MG: 4 TABLET ORAL at 06:00

## 2023-10-15 RX ADMIN — HYDROCODONE BITARTRATE AND ACETAMINOPHEN 1 TABLET: 5; 325 TABLET ORAL at 06:00

## 2023-10-15 RX ADMIN — ONDANSETRON 4 MG: 4 TABLET, ORALLY DISINTEGRATING ORAL at 06:00

## 2023-10-15 ASSESSMENT — PAIN SCALES - GENERAL: PAINLEVEL_OUTOF10: 10

## 2023-10-15 ASSESSMENT — PAIN DESCRIPTION - DESCRIPTORS: DESCRIPTORS: ACHING

## 2023-10-15 ASSESSMENT — ENCOUNTER SYMPTOMS
SORE THROAT: 0
NAUSEA: 0
BACK PAIN: 0
SHORTNESS OF BREATH: 0
DIARRHEA: 0
EYE PAIN: 0
COUGH: 0
VOMITING: 0
ABDOMINAL PAIN: 0
RHINORRHEA: 0

## 2023-10-15 ASSESSMENT — PAIN DESCRIPTION - ORIENTATION: ORIENTATION: RIGHT;UPPER

## 2023-10-15 ASSESSMENT — PAIN - FUNCTIONAL ASSESSMENT: PAIN_FUNCTIONAL_ASSESSMENT: 0-10

## 2023-10-15 ASSESSMENT — PAIN DESCRIPTION - LOCATION: LOCATION: TEETH

## 2023-10-15 NOTE — ED NOTES
Pt presents to ED ambulatory a&O x4. Pt states last night she got chicken stuck in her teeth. Pt states sh flossed the area and since has been having severe pain to the R upper teeth/gums. Pt states she took tylenol and vicodin about 0130 and tried ibuprofen about 1 hour pta.       Jana Ibarra RN  10/15/23 1625

## 2023-11-02 RX ORDER — TIRZEPATIDE 5 MG/.5ML
5 INJECTION, SOLUTION SUBCUTANEOUS WEEKLY
Qty: 4 ADJUSTABLE DOSE PRE-FILLED PEN SYRINGE | Refills: 1 | Status: SHIPPED | OUTPATIENT
Start: 2023-11-02

## 2023-11-02 NOTE — TELEPHONE ENCOUNTER
Patient called stating she has been tolerating the Mounjaro 2.5mg well and would like to try the increase dose of 5mg which she previously had some GI side effects. Advised patient we can try the 5mg as requested but to monitor for SOLO and will reduce dose if needed.

## 2023-11-07 DIAGNOSIS — I65.29 STENOSIS OF CAROTID ARTERY, UNSPECIFIED LATERALITY: ICD-10-CM

## 2023-11-07 DIAGNOSIS — G47.00 INSOMNIA, UNSPECIFIED TYPE: ICD-10-CM

## 2023-11-07 RX ORDER — ZOLPIDEM TARTRATE 5 MG/1
10 TABLET ORAL NIGHTLY PRN
Qty: 60 TABLET | Refills: 1 | Status: SHIPPED | OUTPATIENT
Start: 2023-11-07 | End: 2023-12-07

## 2023-11-07 RX ORDER — ASPIRIN 81 MG/1
81 TABLET ORAL DAILY
Qty: 90 TABLET | Refills: 1 | Status: SHIPPED | OUTPATIENT
Start: 2023-11-07

## 2023-11-09 ENCOUNTER — HOSPITAL ENCOUNTER (OUTPATIENT)
Dept: ULTRASOUND IMAGING | Age: 63
Discharge: HOME | End: 2023-11-11
Payer: COMMERCIAL

## 2023-11-09 VITALS — DIASTOLIC BLOOD PRESSURE: 83 MMHG | HEART RATE: 97 BPM | SYSTOLIC BLOOD PRESSURE: 152 MMHG | RESPIRATION RATE: 18 BRPM

## 2023-11-09 DIAGNOSIS — E04.1 THYROID NODULE: ICD-10-CM

## 2023-11-09 PROCEDURE — 10005 FNA BX W/US GDN 1ST LES: CPT

## 2023-11-13 LAB — SURGICAL PATHOLOGY REPORT: NORMAL

## 2023-12-02 ENCOUNTER — PATIENT MESSAGE (OUTPATIENT)
Dept: FAMILY MEDICINE CLINIC | Age: 63
End: 2023-12-02

## 2023-12-08 RX ORDER — BLOOD-GLUCOSE SENSOR
EACH MISCELLANEOUS
Qty: 2 EACH | Refills: 5 | Status: SHIPPED | OUTPATIENT
Start: 2023-12-08

## 2023-12-26 ENCOUNTER — TELEPHONE (OUTPATIENT)
Dept: FAMILY MEDICINE CLINIC | Age: 63
End: 2023-12-26

## 2023-12-26 NOTE — TELEPHONE ENCOUNTER
----- Message from Anupama Iniguez sent at 12/26/2023  1:20 PM EST -----  Subject: Medication Problem     Medication: metFORMIN (GLUCOPHAGE) 500 MG tablet  Dosage: 2 Tablets in morning and 2 tablets at night, with meals   Ordering Provider: Daryle Evangelist     Question/Problem: Patient states the dosage/directions are incorrect. The   original direction was[de-identified] Take 1 tablet by mouth 2 times daily (with meals)   Take one tablet by mouth twice daily. Patient states that for years now,   she is to take TWO (2) tablets in morning with Breakfast and TWO (2)   tablets at night with evening meal for a total 4 tablets daily. Please   advise.  Thank you       Pharmacy: [facility], [address] [phone] Angie Sexton [phone]    ---------------------------------------------------------------------------  --------------  Jacob FONTAINE  [phone]; OK to leave message on voicemail  ---------------------------------------------------------------------------  --------------    SCRIPT ANSWERS  Relationship to Patient: Self

## 2023-12-26 NOTE — TELEPHONE ENCOUNTER
----- Message from Hardy Savage sent at 12/26/2023  1:22 PM EST -----  Subject: Refill Request    QUESTIONS  Name of Medication? metFORMIN (GLUCOPHAGE) 500 MG tablet  Patient-reported dosage and instructions? TWO (2) tablets in morning with   Breakfast and TWO (2) tablets at night with evening meal for a total 4   tablets daily  How many days do you have left? 0  Preferred Pharmacy? [facility] #[id number]  Pharmacy phone number (if available)? [phone]  ---------------------------------------------------------------------------  --------------  Sander FONTAINE  What is the best way for the office to contact you? OK to leave message on   voicemail  Preferred Call Back Phone Number? [phone]  ---------------------------------------------------------------------------  --------------  SCRIPT ANSWERS  Relationship to Patient?  Self

## 2023-12-27 ENCOUNTER — TELEPHONE (OUTPATIENT)
Dept: FAMILY MEDICINE CLINIC | Age: 63
End: 2023-12-27

## 2024-01-04 ENCOUNTER — HOSPITAL ENCOUNTER (OUTPATIENT)
Facility: CLINIC | Age: 64
Discharge: HOME | End: 2024-01-06
Payer: COMMERCIAL

## 2024-01-04 ENCOUNTER — INITIAL CONSULT (OUTPATIENT)
Dept: PHYSICAL MEDICINE AND REHAB | Age: 64
End: 2024-01-04

## 2024-01-04 ENCOUNTER — HOSPITAL ENCOUNTER (OUTPATIENT)
Dept: GENERAL RADIOLOGY | Facility: CLINIC | Age: 64
Discharge: HOME | End: 2024-01-06
Attending: STUDENT IN AN ORGANIZED HEALTH CARE EDUCATION/TRAINING PROGRAM
Payer: COMMERCIAL

## 2024-01-04 VITALS
HEART RATE: 95 BPM | TEMPERATURE: 97.5 F | WEIGHT: 144.4 LBS | DIASTOLIC BLOOD PRESSURE: 69 MMHG | BODY MASS INDEX: 23.31 KG/M2 | SYSTOLIC BLOOD PRESSURE: 101 MMHG

## 2024-01-04 DIAGNOSIS — L40.50 PSORIATIC ARTHRITIS (HCC): ICD-10-CM

## 2024-01-04 DIAGNOSIS — M54.50 CHRONIC MIDLINE LOW BACK PAIN WITHOUT SCIATICA: ICD-10-CM

## 2024-01-04 DIAGNOSIS — G89.29 CHRONIC MIDLINE LOW BACK PAIN WITHOUT SCIATICA: ICD-10-CM

## 2024-01-04 DIAGNOSIS — R53.82 CHRONIC FATIGUE: ICD-10-CM

## 2024-01-04 DIAGNOSIS — M54.50 CHRONIC MIDLINE LOW BACK PAIN WITHOUT SCIATICA: Primary | ICD-10-CM

## 2024-01-04 DIAGNOSIS — G89.29 CHRONIC MIDLINE LOW BACK PAIN WITHOUT SCIATICA: Primary | ICD-10-CM

## 2024-01-04 PROCEDURE — 72202 X-RAY EXAM SI JOINTS 3/> VWS: CPT

## 2024-01-04 PROCEDURE — 72100 X-RAY EXAM L-S SPINE 2/3 VWS: CPT

## 2024-01-07 ASSESSMENT — ENCOUNTER SYMPTOMS
BACK PAIN: 1
DIARRHEA: 1

## 2024-01-09 ENCOUNTER — TELEPHONE (OUTPATIENT)
Dept: PHYSICAL MEDICINE AND REHAB | Age: 64
End: 2024-01-09

## 2024-01-09 DIAGNOSIS — G89.29 CHRONIC MIDLINE LOW BACK PAIN WITHOUT SCIATICA: Primary | ICD-10-CM

## 2024-01-09 DIAGNOSIS — R53.82 CHRONIC FATIGUE: ICD-10-CM

## 2024-01-09 DIAGNOSIS — M54.50 CHRONIC MIDLINE LOW BACK PAIN WITHOUT SCIATICA: Primary | ICD-10-CM

## 2024-01-09 NOTE — TELEPHONE ENCOUNTER
Reyna called our office back. She was informed of the results of her xray being normal. Per plan of care Dr Farrar said she could consider therapy. I asked PT if she wanted to go to therapy and she confirmed. She would like to go to [facility]. We provided phone number for her to call.

## 2024-01-17 ENCOUNTER — TELEPHONE (OUTPATIENT)
Dept: FAMILY MEDICINE CLINIC | Age: 64
End: 2024-01-17

## 2024-01-17 RX ORDER — TIRZEPATIDE 5 MG/.5ML
5 INJECTION, SOLUTION SUBCUTANEOUS WEEKLY
Qty: 4 ADJUSTABLE DOSE PRE-FILLED PEN SYRINGE | Refills: 1 | Status: SHIPPED | OUTPATIENT
Start: 2024-01-17

## 2024-01-23 ENCOUNTER — HOSPITAL ENCOUNTER (OUTPATIENT)
Dept: PHYSICAL THERAPY | Facility: CLINIC | Age: 64
Setting detail: THERAPIES SERIES
Discharge: HOME | End: 2024-01-23
Payer: COMMERCIAL

## 2024-01-23 PROCEDURE — 97110 THERAPEUTIC EXERCISES: CPT

## 2024-01-23 PROCEDURE — 97161 PT EVAL LOW COMPLEX 20 MIN: CPT

## 2024-01-23 NOTE — CONSULTS
Decision Making [x] Low [] Moderate [] High    [x] Low Complexity [] Moderate Complexity [] High Complexity       Treatment Charges: Mins Units   [x] Evaluation       [x]  Low       []  Moderate       []  High 30 1   []  Modalities     [x]  Ther Exercise 10 1   []  Manual Therapy     []  Ther Activities     []  Aquatics     []  Vasocompression     []  Other       TOTAL BILLABLE TIME: 40 minutes     Time in: 6:05 pm        Time out: 6:45 pm     Electronically signed by: Dia Blankenship PT        Physician Signature:________________________________Date:__________________  By signing above or cosigning this note, I have reviewed this plan of care and certify a need for medically necessary rehabilitation services.     *PLEASE SIGN ABOVE AND FAX BACK ALL PAGES*

## 2024-01-29 ENCOUNTER — HOSPITAL ENCOUNTER (OUTPATIENT)
Dept: PHYSICAL THERAPY | Facility: CLINIC | Age: 64
Setting detail: THERAPIES SERIES
Discharge: HOME | End: 2024-01-29
Payer: COMMERCIAL

## 2024-01-29 NOTE — FLOWSHEET NOTE
[] [facility]  Outpatient Rehabilitation &  Therapy  [address]  [phone]  [phone] [x] [facility]  Outpatient Rehabilitation &  Therapy  [address]  P: [phone]  F: [phone] [] [facility]  Outpatient Rehabilitation &  Therapy  [address]  P: [phone]  F: [phone] [] [facility]  Outpatient Rehabilitation &  Therapy  [address]  [phone]  [phone] [] [facility]  Outpatient Rehabilitation &  Therapy  [address]   P: [phone]  F: [phone]  [] [facility]  Outpatient Rehabilitation &  Therapy  [address]  P: [phone]  F: [phone] [] [facility]  Outpatient Rehabilitation &  Therapy  [address]  P: [phone]  F: [phone] [] [facility]  Outpatient Rehabilitation &  Therapy  [address]  P: [phone]  F: [phone] [] [facility]  Outpatient Rehabilitation &  Therapy  [address]  P: [phone]  F: [phone]  [] [facility] Outpatient Rehabilitation &  Therapy  [address]  P: [phone]  F: [phone]     Therapy Cancel/No Show note    Date: 2024  Patient: Reyna Hoyos  : 1960  MRN: [id number]    Cancels/No Shows to date:     For today's appointment patient:    [x]  Cancelled    [] Rescheduled appointment    [] No-show     Reason given by patient:    []  Patient ill    []  Conflicting appointment    [] No transportation      [] Conflict with work    [] No reason given    [] Weather related    [] COVID-19    [x] Other:      Comments:  last minute home situation       [x] Next appointment was confirmed    Electronically signed by: Aria Warren PTA

## 2024-01-31 ENCOUNTER — HOSPITAL ENCOUNTER (OUTPATIENT)
Dept: PHYSICAL THERAPY | Facility: CLINIC | Age: 64
Setting detail: THERAPIES SERIES
Discharge: HOME | End: 2024-01-31
Payer: COMMERCIAL

## 2024-01-31 PROCEDURE — 97110 THERAPEUTIC EXERCISES: CPT

## 2024-01-31 NOTE — FLOWSHEET NOTE
gait speed by at least 0.1 m/s with SPC to demonstrate improved safety for household ambulation.  []  []  []      4. Pt to complete 5x STS within 24\" with or without UE support to demonstrate improved functional strength. []  []  []      5. Pt to demonstrate ability to ambulate at least 150' with least restrictive AD and pain < 5/10 for improved community access.  []  []  []      6. Pt to demonstrate ability to maintain tandem stance for at least 20\" with appropriate postural stability for improved safety with gait.  []  []  []            Patient goals: regain function, reduce pain    Pt. Education:  [x] Yes  [] No  [x] Reviewed Prior HEP/Ed  Method of Education: [x] Verbal  [x] Demo  [x] Written  Access Code: [email or web address]  URL: [email or web address]  Date: 01/23/2024  Prepared by: Dia Long     Exercises  - Seated Hamstring Stretch  - 7 x weekly - 3 sets - 3 reps - 20\" hold  - Seated Heel Toe Raises  - 7 x weekly - 3 sets - 20 reps  - Seated Long Arc Quad  - 1 x daily - 7 x weekly - 20 reps  - Seated Chest Press  - 1 x daily - 7 x weekly - 10 reps  - Seated Hip Abduction with Resistance  - 1 x daily - 7 x weekly - 2 sets - 10 reps    Discussed aquatic therapy or pool access- pt attends [facility] and would like to trial aquatic exercise there  Access Code: [email or web address]  URL: [email or web address]  Date: 01/31/2024  Prepared by: Dia Long    Exercises  - Forward and Backward Stepping at Pool Wall  - 1 x daily - 7 x weekly - 3 sets - 10 reps  - Lateral Stepping at Pool Wall  - 1 x daily - 7 x weekly - 3 sets - 10 reps  - Heel Toe Raises at Pool Wall  - 1 x daily - 7 x weekly - 3 sets - 10 reps  - Standing 3-Way Kick with Ankle Float at Pool Wall  - 1 x daily - 7 x weekly - 3 sets - 10 reps  - Lunge to Target at Pool Wall  - 1 x daily - 7 x weekly - 3 sets - 10 reps  - Standing March at Pool Wall  - 1 x daily - 7 x weekly - 3 sets - 10 reps  Comprehension of Education:  [x] Verbalizes understanding.  [x]

## 2024-02-06 ENCOUNTER — HOSPITAL ENCOUNTER (OUTPATIENT)
Dept: PHYSICAL THERAPY | Facility: CLINIC | Age: 64
Setting detail: THERAPIES SERIES
Discharge: HOME | End: 2024-02-06
Payer: COMMERCIAL

## 2024-02-06 RX ORDER — INSULIN GLARGINE 300 U/ML
INJECTION, SOLUTION SUBCUTANEOUS
Qty: 4.5 ML | Refills: 0 | Status: SHIPPED | OUTPATIENT
Start: 2024-02-06 | End: 2024-02-09 | Stop reason: SDUPTHER

## 2024-02-07 NOTE — FLOWSHEET NOTE
[] [facility]  Outpatient Rehabilitation &  Therapy  [address]  [phone]  [phone] [x] [facility]  Outpatient Rehabilitation &  Therapy  [address]  P: [phone]  F: [phone] [] [facility]  Outpatient Rehabilitation &  Therapy  [address]  P: [phone]  F: [phone] [] [facility]  Outpatient Rehabilitation &  Therapy  [address]  [phone]  [phone] [] [facility]  Outpatient Rehabilitation &  Therapy  [address]   P: [phone]  F: [phone]  [] [facility]  Outpatient Rehabilitation &  Therapy  [address]  P: [phone]  F: [phone] [] [facility]  Outpatient Rehabilitation &  Therapy  [address]  P: [phone]  F: [phone] [] [facility]  Outpatient Rehabilitation &  Therapy  [address]  P: [phone]  F: [phone] [] [facility]  Outpatient Rehabilitation &  Therapy  [address]  P: [phone]  F: [phone]  [] [facility] Outpatient Rehabilitation &  Therapy  [address]  P: [phone]  F: [phone]     Therapy Cancel/No Show note    Date: 2024  Patient: Reyna Hoyos  : 1960  MRN: [id number]    Cancels/No Shows to date:  not counted    For today's appointment patient:    [x]  Cancelled    [] Rescheduled appointment    [] No-show     Reason given by patient:    []  Patient ill    []  Conflicting appointment    [] No transportation      [] Conflict with work    [] No reason given    [] Weather related    [] COVID-19    [x] Other:      Comments:  provider off      [x] Next appointment was confirmed    Electronically signed by: Zachary Hooper PT

## 2024-02-08 ENCOUNTER — HOSPITAL ENCOUNTER (OUTPATIENT)
Dept: PHYSICAL THERAPY | Facility: CLINIC | Age: 64
Setting detail: THERAPIES SERIES
Discharge: HOME | End: 2024-02-08
Payer: COMMERCIAL

## 2024-02-08 PROCEDURE — 97110 THERAPEUTIC EXERCISES: CPT

## 2024-02-08 NOTE — FLOWSHEET NOTE
[] [facility]  Outpatient Rehabilitation &  Therapy  [address]  [phone]  [phone] [x] [facility]  Outpatient Rehabilitation &  Therapy  [address]  P: [phone]  F: [phone] [] [place]  Outpatient Rehabilitation &  Therapy  [address]  P: [phone]  F: [phone] [] [facility]  Outpatient Rehabilitation &  Therapy  [address]  [phone]  [phone] [] [facility]  Outpatient Rehabilitation &  Therapy  [address]   P: [phone]  F: [phone]  [] [facility]  Outpatient Rehabilitation &  Therapy  [address]  P: [phone]  F: [phone] [] [facility]  Outpatient Rehabilitation &  Therapy  [address]  P: [phone]  F: [phone] [] [facility]  Outpatient Rehabilitation &  Therapy  [address]  P: [phone]  F: [phone] [] [facility]  Outpatient Rehabilitation &  Therapy  [address]  P: [phone]  F: [phone]  [] [facility] Outpatient Rehabilitation &  Therapy  [address]  P: [phone]  F: [phone]     Physical Therapy Daily Treatment Note    Date:  2024  Patient Name:  Reyna Hoyos    :  1960  MRN: [id number]  Physician: Michelle Farrar MD                                     Insurance: BCBS (VBMN, $0 copay)  Medical Diagnosis: M54.50, G89.29 (ICD-10-CM) - Chronic midline low back pain without sciatica                      Rehab Codes: M54.9, M62.81, R26.89, R20.2  Onset Date: 2023                     Next Dr.'s appt.: 3/28/24  Visit# / total visits: ; Progress note for Medicare patient due at visit 8     Cancels/No Shows: 1/0    Subjective:    Pain:  [] Yes  [x] No Location: low back  Pain Rating: (0-10 scale)

## 2024-02-09 RX ORDER — INSULIN GLARGINE 300 U/ML
INJECTION, SOLUTION SUBCUTANEOUS
Qty: 10 ML | Refills: 0 | Status: SHIPPED | OUTPATIENT
Start: 2024-02-09

## 2024-02-09 NOTE — TELEPHONE ENCOUNTER
Pt needs 90 day supply sent to [facility] pt spouse stated insurance is askiing for 90 day supply  pt is out of medication

## 2024-02-13 ENCOUNTER — HOSPITAL ENCOUNTER (OUTPATIENT)
Dept: PHYSICAL THERAPY | Facility: CLINIC | Age: 64
Setting detail: THERAPIES SERIES
Discharge: HOME | End: 2024-02-13
Payer: COMMERCIAL

## 2024-02-13 PROCEDURE — 97110 THERAPEUTIC EXERCISES: CPT

## 2024-02-13 NOTE — FLOWSHEET NOTE
6/10  Pain altered Tx:  [] No  [x] Yes  Action: low tolerance to exercise and progressions  Comments: Pt arrives with slight reduction in pain today compared to previous session. Did have soreness from last session. Continues with compliance to HEP. Plans to travel to [place] next week.       Objective:  Modalities:   Precautions: h/o CABG x 2 05/22, T12 kyphoplasty 08/23; impaired vision; FALL RISK  Exercises:  Exercise Reps/ Time Weight/ Level Comments    Nustep  6' L1 For cardiovascular endurance and LE strength/mobility   Inc duration 2/13         Seated         Chest press 2x10   Playball, cues for core activation   Trunk rotation 10x  Playball , pain free range    LAQ 2x10 A     Hip abd 2x10 lime  inc reps 2/13   Hip add iso 20x5\"  Inc reps 2/13   Marches  20x     Heel toe raise 20x    not today- standing    HS curls 2x10 Orange     Glute Sets  20x5\"     TA + row  2x10 Orange     Scapular retraction + TAQUERIA  20x     HS stretch 2x20\"             Standing    New 2/8, B UE assist    Heel raises  10x2  Inc reps 2/15   Step ups  10x 6\"  Seated Rest break after 10 on R LE   Seated rest break after 10 on L LE    Hip abduction  10xea      Hip extension  10xea  A Inc reps 2/13   Side Stepping 2L // bars  New 2/13   NBOS  30\"  New 2/13- EO    NBOS  2x15\"  New 2/13- EC    Other: exercises performed in seated standard chair this date for pt comfort, low tolerance to sitting at mat table or supine positioning at time of evaluation     Specific Instructions for next treatment:   -restore lumbopelvic mobility  -global core and BLE strengthening  -gait training and balance  -consider MHP as needed       Treatment Charges: Mins Units   []  Modalities     [x]  Ther Exercise 40 3   []  Manual Therapy     []  Ther Activities     [x]  Neuro Re-ed 2 --   []  Vasocompression     [] Gait     []  Other     Total Billable time 42 3       Assessment: [x] Progressing toward goals. Initiated session with increased duration on nustep

## 2024-02-14 ENCOUNTER — OFFICE VISIT (OUTPATIENT)
Dept: FAMILY MEDICINE CLINIC | Age: 64
End: 2024-02-14
Payer: COMMERCIAL

## 2024-02-14 VITALS
HEIGHT: 66 IN | BODY MASS INDEX: 24.04 KG/M2 | TEMPERATURE: 97.6 F | OXYGEN SATURATION: 98 % | WEIGHT: 149.6 LBS | DIASTOLIC BLOOD PRESSURE: 60 MMHG | SYSTOLIC BLOOD PRESSURE: 100 MMHG | HEART RATE: 83 BPM

## 2024-02-14 DIAGNOSIS — G47.00 INSOMNIA, UNSPECIFIED TYPE: ICD-10-CM

## 2024-02-14 DIAGNOSIS — Z79.4 TYPE 2 DIABETES MELLITUS WITH DIABETIC NEUROPATHY, WITH LONG-TERM CURRENT USE OF INSULIN (HCC): Primary | ICD-10-CM

## 2024-02-14 DIAGNOSIS — E11.40 TYPE 2 DIABETES MELLITUS WITH DIABETIC NEUROPATHY, WITH LONG-TERM CURRENT USE OF INSULIN (HCC): Primary | ICD-10-CM

## 2024-02-14 PROBLEM — L40.0 PSORIASIS VULGARIS: Status: ACTIVE | Noted: 2024-02-14

## 2024-02-14 PROBLEM — M81.0 AGE-RELATED OSTEOPOROSIS WITHOUT CURRENT PATHOLOGICAL FRACTURE: Status: ACTIVE | Noted: 2024-02-14

## 2024-02-14 PROBLEM — Z93.2 ILEOSTOMY PRESENT (HCC): Status: ACTIVE | Noted: 2017-10-02

## 2024-02-14 PROBLEM — I10 PRIMARY HYPERTENSION: Status: ACTIVE | Noted: 2022-05-26

## 2024-02-14 PROBLEM — L40.50 ARTHROPATHIC PSORIASIS, UNSPECIFIED (HCC): Status: ACTIVE | Noted: 2024-02-14

## 2024-02-14 LAB
CREATININE URINE POCT: ABNORMAL
HBA1C MFR BLD: 7.5 %
MICROALBUMIN/CREAT 24H UR: ABNORMAL MG/G{CREAT}
MICROALBUMIN/CREAT UR-RTO: ABNORMAL

## 2024-02-14 PROCEDURE — 3078F DIAST BP <80 MM HG: CPT | Performed by: NURSE PRACTITIONER

## 2024-02-14 PROCEDURE — 3074F SYST BP LT 130 MM HG: CPT | Performed by: NURSE PRACTITIONER

## 2024-02-14 PROCEDURE — 83036 HEMOGLOBIN GLYCOSYLATED A1C: CPT | Performed by: NURSE PRACTITIONER

## 2024-02-14 PROCEDURE — 3051F HG A1C>EQUAL 7.0%<8.0%: CPT | Performed by: NURSE PRACTITIONER

## 2024-02-14 PROCEDURE — 99213 OFFICE O/P EST LOW 20 MIN: CPT | Performed by: NURSE PRACTITIONER

## 2024-02-14 PROCEDURE — 82044 UR ALBUMIN SEMIQUANTITATIVE: CPT | Performed by: NURSE PRACTITIONER

## 2024-02-14 RX ORDER — INSULIN GLARGINE 300 U/ML
INJECTION, SOLUTION SUBCUTANEOUS
Qty: 27 ML | Refills: 0 | Status: SHIPPED
Start: 2024-02-14 | End: 2024-02-15 | Stop reason: SDUPTHER

## 2024-02-14 RX ORDER — TIRZEPATIDE 7.5 MG/.5ML
7.5 INJECTION, SOLUTION SUBCUTANEOUS WEEKLY
Qty: 2 ML | Refills: 1 | Status: SHIPPED | OUTPATIENT
Start: 2024-02-14

## 2024-02-14 RX ORDER — ZOLPIDEM TARTRATE 5 MG/1
10 TABLET ORAL NIGHTLY PRN
Qty: 60 TABLET | Refills: 0 | Status: SHIPPED | OUTPATIENT
Start: 2024-02-14 | End: 2024-03-15

## 2024-02-14 NOTE — PROGRESS NOTES
[facility]  [address] [place] [address]  [phone]      Reyna Hoyos is a 63 y.o. female who presents today for her  medicalconditions/complaints as noted below.  Reyna Hoyos is c/o of Diabetes  .    HPI:    Diabetes  She presents for her follow-up diabetic visit. Her disease course has been fluctuating. Pertinent negatives for hypoglycemia include no dizziness or headaches. (CGM will alert her if BG drops < 70 and has a few times, has adjusted her insulin timing and this has helped resolve the issue also eating more protein at bedtime) Associated symptoms include foot paresthesias. Pertinent negatives for diabetes include no chest pain, no fatigue, no foot ulcerations, no polydipsia, no polyphagia, no polyuria and no weakness. There are no hypoglycemic complications. Symptoms are stable. Diabetic complications include heart disease and peripheral neuropathy. Pertinent negatives for diabetic complications include no CVA. Risk factors for coronary artery disease include sedentary lifestyle, stress, post-menopausal, dyslipidemia and diabetes mellitus. Current diabetic treatment includes oral agent (monotherapy), diet and insulin injections (glp-1). She is compliant with treatment all of the time. Her weight is stable. She is following a diabetic diet. Meal planning includes avoidance of concentrated sweets. Her overall blood glucose range is  mg/dl. She does not see a podiatrist.Eye exam is current.     Pt here for dm follow up. Reports that her blood sugar readings at home average around 100 for past 1 month. She is currently tolerating the mounjaro well without known side effects. Does report that metformin causes her some dizziness. She would like to start weaning off of this.     Past Medical History:   Diagnosis Date    CAD (coronary artery disease)     H/o CABGx2. Sees [facility]    Colostomy in place (HCC)     Crohn's disease (HCC)     Frequent UTI

## 2024-02-15 ENCOUNTER — HOSPITAL ENCOUNTER (OUTPATIENT)
Dept: PHYSICAL THERAPY | Facility: CLINIC | Age: 64
Setting detail: THERAPIES SERIES
Discharge: HOME | End: 2024-02-15
Payer: COMMERCIAL

## 2024-02-15 ENCOUNTER — TELEPHONE (OUTPATIENT)
Dept: PHARMACY | Age: 64
End: 2024-02-15

## 2024-02-15 PROCEDURE — 97110 THERAPEUTIC EXERCISES: CPT

## 2024-02-15 RX ORDER — INSULIN GLARGINE 300 U/ML
INJECTION, SOLUTION SUBCUTANEOUS
Qty: 27 ML | Refills: 0 | Status: SHIPPED | OUTPATIENT
Start: 2024-02-15

## 2024-02-15 NOTE — TELEPHONE ENCOUNTER
Left vmail to get patient rescheduled for Diabetes follow up appt. Requested call back and provided clinic phone.

## 2024-02-15 NOTE — FLOWSHEET NOTE
Verbalizes understanding.  [] Demonstrates understanding.  [] Needs review.  [x] Demonstrates/verbalizes HEP/Ed previously given.     Plan: [x] Continue current frequency toward long and short term goals.    [x] Specific Instructions for subsequent treatments: update HEP next session, pt on hold from therapy until return from vacation 3/5      Time In: 4:59 pm             Time Out:  5: 53    Electronically signed by:  VIDA DU PT

## 2024-03-05 ENCOUNTER — HOSPITAL ENCOUNTER (OUTPATIENT)
Dept: PHYSICAL THERAPY | Facility: CLINIC | Age: 64
Setting detail: THERAPIES SERIES
Discharge: HOME | End: 2024-03-05

## 2024-03-05 NOTE — FLOWSHEET NOTE
[] [facility]  Outpatient Rehabilitation &  Therapy  [address]  [phone]  [phone] [x] [facility]  Outpatient Rehabilitation &  Therapy  [address]  P: [phone]  F: [phone] [] [facility]  Outpatient Rehabilitation &  Therapy  [address]  P: [phone]  F: [phone] [] [facility]  Outpatient Rehabilitation &  Therapy  [address]  [phone]  [phone] [] [facility]  Outpatient Rehabilitation &  Therapy  [address]   P: [phone]  F: [phone]  [] [facility]  Outpatient Rehabilitation &  Therapy  [address]  P: [phone]  F: [phone] [] [facility]  Outpatient Rehabilitation &  Therapy  [address]  P: [phone]  F: [phone] [] [facility]  Outpatient Rehabilitation &  Therapy  [address]  P: [phone]  F: [phone] [] [facility]  Outpatient Rehabilitation &  Therapy  [address]  P: [phone]  F: [phone]  [] [facility] Outpatient Rehabilitation &  Therapy  [address]  P: [phone]  F: [phone]     Therapy Cancel/No Show note    Date: 3/5/2024  Patient: Reyna Hoyos  : 1960  MRN: [id number]    Cancels/No Shows to date: 0    For today's appointment patient:    [x]  Cancelled    [] Rescheduled appointment    [] No-show     Reason given by patient:    [x]  Patient ill    []  Conflicting appointment    [] No transportation      [] Conflict with work    [] No reason given    [] Weather related    [] COVID-19    [] Other:      Comments:        [x] Next appointment was confirmed    Electronically signed by: Dia Blankenship, PT

## 2024-03-07 ENCOUNTER — HOSPITAL ENCOUNTER (OUTPATIENT)
Dept: PHYSICAL THERAPY | Facility: CLINIC | Age: 64
Setting detail: THERAPIES SERIES
Discharge: HOME | End: 2024-03-07

## 2024-03-07 NOTE — FLOWSHEET NOTE
[] [facility]  Outpatient Rehabilitation &  Therapy  [address]  [phone]  [phone] [x] [facility]  Outpatient Rehabilitation &  Therapy  [address]  P: [phone]  F: [phone] [] [facility]  Outpatient Rehabilitation &  Therapy  [address]  P: [phone]  F: [phone] [] [facility]  Outpatient Rehabilitation &  Therapy  [address]  [phone]  [phone] [] [facility]  Outpatient Rehabilitation &  Therapy  [address]   P: [phone]  F: [phone]  [] [facility]  Outpatient Rehabilitation &  Therapy  [address]  P: [phone]  F: [phone] [] [facility]  Outpatient Rehabilitation &  Therapy  [address]  P: [phone]  F: [phone] [] [facility]  Outpatient Rehabilitation &  Therapy  [address]  P: [phone]  F: [phone] [] [facility]  Outpatient Rehabilitation &  Therapy  [address]  P: [phone]  F: [phone]  [] [facility] Outpatient Rehabilitation &  Therapy  [address]  P: [phone]  F: [phone]     Therapy Cancel/No Show note    Date: 3/7/2024  Patient: Reyna Hoyos  : 1960  MRN: [id number]    Cancels/No Shows to date: 3/0    For today's appointment patient:    [x]  Cancelled    [] Rescheduled appointment    [] No-show     Reason given by patient:    []  Patient ill    []  Conflicting appointment    [] No transportation      [] Conflict with work    [x] No reason given    [] Weather related    [] COVID-19    [x] Other:      Comments:  Pt to call to schedule future appointments.       [] Next appointment was confirmed    Electronically signed by: Aria Warren PTA

## 2024-03-25 ENCOUNTER — TELEPHONE (OUTPATIENT)
Dept: FAMILY MEDICINE CLINIC | Age: 64
End: 2024-03-25

## 2024-03-25 NOTE — TELEPHONE ENCOUNTER
----- Message from Jennifer Byrd sent at 3/25/2024 11:44 AM EDT -----  Subject: Message to Provider    QUESTIONS  Information for Provider? Patient is having teeth pulled and needs a note   starting what medicine she should stop taking please have ready for pick   up   ---------------------------------------------------------------------------  --------------  CALL BACK INFO  [phone]; OK to leave message on voicemail  ---------------------------------------------------------------------------  --------------  SCRIPT ANSWERS  Relationship to Patient? Self

## 2024-04-01 DIAGNOSIS — G47.00 INSOMNIA, UNSPECIFIED TYPE: ICD-10-CM

## 2024-04-01 RX ORDER — ZOLPIDEM TARTRATE 5 MG/1
TABLET ORAL
Qty: 60 TABLET | Refills: 0 | Status: SHIPPED | OUTPATIENT
Start: 2024-04-01 | End: 2024-05-01

## 2024-05-07 LAB
ESTIMATED AVERAGE GLUCOSE: 148
HBA1C MFR BLD: 6.8 %

## 2024-05-07 RX ORDER — BLOOD-GLUCOSE SENSOR
EACH MISCELLANEOUS
Qty: 2 EACH | Refills: 5 | Status: SHIPPED | OUTPATIENT
Start: 2024-05-07

## 2024-05-14 ENCOUNTER — OFFICE VISIT (OUTPATIENT)
Dept: FAMILY MEDICINE CLINIC | Age: 64
End: 2024-05-14
Payer: COMMERCIAL

## 2024-05-14 VITALS
OXYGEN SATURATION: 93 % | DIASTOLIC BLOOD PRESSURE: 60 MMHG | TEMPERATURE: 98.2 F | SYSTOLIC BLOOD PRESSURE: 112 MMHG | WEIGHT: 140.8 LBS | HEIGHT: 66 IN | BODY MASS INDEX: 22.63 KG/M2 | HEART RATE: 98 BPM

## 2024-05-14 DIAGNOSIS — N17.9 AKI (ACUTE KIDNEY INJURY) (HCC): ICD-10-CM

## 2024-05-14 DIAGNOSIS — Z79.4 TYPE 2 DIABETES MELLITUS WITH DIABETIC NEUROPATHY, WITH LONG-TERM CURRENT USE OF INSULIN (HCC): Primary | ICD-10-CM

## 2024-05-14 DIAGNOSIS — E11.40 TYPE 2 DIABETES MELLITUS WITH DIABETIC NEUROPATHY, WITH LONG-TERM CURRENT USE OF INSULIN (HCC): Primary | ICD-10-CM

## 2024-05-14 PROBLEM — H35.353 CYSTOID MACULAR EDEMA OF BOTH EYES: Status: ACTIVE | Noted: 2024-03-11

## 2024-05-14 PROCEDURE — 3078F DIAST BP <80 MM HG: CPT | Performed by: NURSE PRACTITIONER

## 2024-05-14 PROCEDURE — 3074F SYST BP LT 130 MM HG: CPT | Performed by: NURSE PRACTITIONER

## 2024-05-14 PROCEDURE — 3044F HG A1C LEVEL LT 7.0%: CPT | Performed by: NURSE PRACTITIONER

## 2024-05-14 PROCEDURE — 99214 OFFICE O/P EST MOD 30 MIN: CPT | Performed by: NURSE PRACTITIONER

## 2024-05-14 ASSESSMENT — ENCOUNTER SYMPTOMS
VOMITING: 0
ABDOMINAL PAIN: 0
COUGH: 0
WHEEZING: 0
NAUSEA: 0
SHORTNESS OF BREATH: 0
CHEST TIGHTNESS: 0

## 2024-05-16 ENCOUNTER — TELEPHONE (OUTPATIENT)
Dept: FAMILY MEDICINE CLINIC | Age: 64
End: 2024-05-16

## 2024-05-16 DIAGNOSIS — G47.00 INSOMNIA, UNSPECIFIED TYPE: ICD-10-CM

## 2024-05-16 RX ORDER — METRONIDAZOLE 500 MG/1
TABLET ORAL
COMMUNITY
Start: 2024-05-13 | End: 2024-05-16 | Stop reason: SDUPTHER

## 2024-05-16 RX ORDER — LEVOFLOXACIN 500 MG/1
TABLET, FILM COATED ORAL
COMMUNITY
Start: 2024-05-13 | End: 2024-05-16 | Stop reason: SDUPTHER

## 2024-05-16 RX ORDER — LEVOFLOXACIN 500 MG/1
TABLET, FILM COATED ORAL
Qty: 7 TABLET | Refills: 0 | Status: SHIPPED | OUTPATIENT
Start: 2024-05-16

## 2024-05-16 RX ORDER — ZOLPIDEM TARTRATE 5 MG/1
TABLET ORAL
Qty: 60 TABLET | Refills: 1 | Status: SHIPPED | OUTPATIENT
Start: 2024-05-16 | End: 2024-06-15

## 2024-05-16 RX ORDER — METRONIDAZOLE 500 MG/1
TABLET ORAL
Qty: 14 TABLET | Refills: 0 | Status: SHIPPED | OUTPATIENT
Start: 2024-05-16

## 2024-05-16 NOTE — TELEPHONE ENCOUNTER
Pt stated was not able to get tooth pulled still has infection pt stated needs these medications refilled please send to [facility] in [place]

## 2024-05-29 RX ORDER — VENLAFAXINE HYDROCHLORIDE 75 MG/1
CAPSULE, EXTENDED RELEASE ORAL
Qty: 90 CAPSULE | Refills: 1 | Status: SHIPPED | OUTPATIENT
Start: 2024-05-29

## 2024-05-30 ENCOUNTER — TELEPHONE (OUTPATIENT)
Dept: FAMILY MEDICINE CLINIC | Age: 64
End: 2024-05-30

## 2024-05-30 DIAGNOSIS — Z79.4 TYPE 2 DIABETES MELLITUS WITH DIABETIC NEUROPATHY, WITH LONG-TERM CURRENT USE OF INSULIN (HCC): Primary | ICD-10-CM

## 2024-05-30 DIAGNOSIS — E11.40 TYPE 2 DIABETES MELLITUS WITH DIABETIC NEUROPATHY, WITH LONG-TERM CURRENT USE OF INSULIN (HCC): Primary | ICD-10-CM

## 2024-05-30 NOTE — TELEPHONE ENCOUNTER
Patient contacted the office stating that [facility] pharmacy informed her that there is a national shortage on the Mounjaro 7.5 and that she needs to have the 5.0 sent in for now.    Please advise.

## 2024-06-10 DIAGNOSIS — I65.29 STENOSIS OF CAROTID ARTERY, UNSPECIFIED LATERALITY: ICD-10-CM

## 2024-06-11 RX ORDER — ASPIRIN 81 MG/1
81 TABLET, COATED ORAL DAILY
Qty: 90 TABLET | Refills: 1 | Status: SHIPPED | OUTPATIENT
Start: 2024-06-11

## 2024-07-24 RX ORDER — INSULIN GLARGINE 300 U/ML
INJECTION, SOLUTION SUBCUTANEOUS
Qty: 60 ML | Refills: 0 | Status: SHIPPED | OUTPATIENT
Start: 2024-07-24

## 2024-08-19 DIAGNOSIS — G47.00 INSOMNIA, UNSPECIFIED TYPE: ICD-10-CM

## 2024-08-19 RX ORDER — ZOLPIDEM TARTRATE 5 MG/1
TABLET ORAL
Qty: 60 TABLET | Refills: 0 | Status: SHIPPED | OUTPATIENT
Start: 2024-08-19 | End: 2024-09-18

## 2024-09-06 RX ORDER — BLOOD-GLUCOSE SENSOR
EACH MISCELLANEOUS
Qty: 2 EACH | Refills: 5 | Status: SHIPPED | OUTPATIENT
Start: 2024-09-06

## 2024-10-08 DIAGNOSIS — G47.00 INSOMNIA, UNSPECIFIED TYPE: ICD-10-CM

## 2024-10-08 RX ORDER — ZOLPIDEM TARTRATE 5 MG/1
TABLET ORAL
Qty: 60 TABLET | Refills: 1 | Status: SHIPPED | OUTPATIENT
Start: 2024-10-08 | End: 2024-11-07

## 2024-11-04 DIAGNOSIS — Z79.4 TYPE 2 DIABETES MELLITUS WITH DIABETIC NEUROPATHY, WITH LONG-TERM CURRENT USE OF INSULIN (HCC): ICD-10-CM

## 2024-11-04 DIAGNOSIS — E11.40 TYPE 2 DIABETES MELLITUS WITH DIABETIC NEUROPATHY, WITH LONG-TERM CURRENT USE OF INSULIN (HCC): ICD-10-CM

## 2024-11-04 RX ORDER — TIRZEPATIDE 5 MG/.5ML
5 INJECTION, SOLUTION SUBCUTANEOUS WEEKLY
Qty: 2 ML | Refills: 1 | Status: SHIPPED | OUTPATIENT
Start: 2024-11-04

## 2024-11-22 RX ORDER — VENLAFAXINE HYDROCHLORIDE 75 MG/1
CAPSULE, EXTENDED RELEASE ORAL
Qty: 90 CAPSULE | Refills: 1 | Status: SHIPPED | OUTPATIENT
Start: 2024-11-22

## 2024-12-02 DIAGNOSIS — I65.29 STENOSIS OF CAROTID ARTERY, UNSPECIFIED LATERALITY: ICD-10-CM

## 2024-12-03 RX ORDER — ASPIRIN 81 MG/1
81 TABLET, COATED ORAL DAILY
Qty: 90 TABLET | Refills: 1 | Status: SHIPPED | OUTPATIENT
Start: 2024-12-03

## 2024-12-11 ENCOUNTER — OFFICE VISIT (OUTPATIENT)
Dept: FAMILY MEDICINE CLINIC | Age: 64
End: 2024-12-11

## 2024-12-11 VITALS
OXYGEN SATURATION: 99 % | TEMPERATURE: 97.5 F | HEART RATE: 83 BPM | DIASTOLIC BLOOD PRESSURE: 62 MMHG | WEIGHT: 141.2 LBS | HEIGHT: 66 IN | SYSTOLIC BLOOD PRESSURE: 122 MMHG | BODY MASS INDEX: 22.69 KG/M2

## 2024-12-11 DIAGNOSIS — Z00.00 ROUTINE GENERAL MEDICAL EXAMINATION AT A HEALTH CARE FACILITY: ICD-10-CM

## 2024-12-11 DIAGNOSIS — E11.40 TYPE 2 DIABETES MELLITUS WITH DIABETIC NEUROPATHY, WITH LONG-TERM CURRENT USE OF INSULIN (HCC): ICD-10-CM

## 2024-12-11 DIAGNOSIS — Z01.818 PRE-OP EVALUATION: Primary | ICD-10-CM

## 2024-12-11 DIAGNOSIS — Z79.4 TYPE 2 DIABETES MELLITUS WITH DIABETIC NEUROPATHY, WITH LONG-TERM CURRENT USE OF INSULIN (HCC): ICD-10-CM

## 2024-12-11 DIAGNOSIS — M51.360 DEGENERATION OF INTERVERTEBRAL DISC OF LUMBAR REGION WITH DISCOGENIC BACK PAIN: ICD-10-CM

## 2024-12-11 DIAGNOSIS — E78.2 MIXED HYPERLIPIDEMIA: ICD-10-CM

## 2024-12-11 DIAGNOSIS — Z12.31 ENCOUNTER FOR SCREENING MAMMOGRAM FOR MALIGNANT NEOPLASM OF BREAST: ICD-10-CM

## 2024-12-11 SDOH — ECONOMIC STABILITY: FOOD INSECURITY: WITHIN THE PAST 12 MONTHS, YOU WORRIED THAT YOUR FOOD WOULD RUN OUT BEFORE YOU GOT MONEY TO BUY MORE.: NEVER TRUE

## 2024-12-11 SDOH — ECONOMIC STABILITY: FOOD INSECURITY: WITHIN THE PAST 12 MONTHS, THE FOOD YOU BOUGHT JUST DIDN'T LAST AND YOU DIDN'T HAVE MONEY TO GET MORE.: NEVER TRUE

## 2024-12-11 SDOH — ECONOMIC STABILITY: INCOME INSECURITY: HOW HARD IS IT FOR YOU TO PAY FOR THE VERY BASICS LIKE FOOD, HOUSING, MEDICAL CARE, AND HEATING?: NOT HARD AT ALL

## 2024-12-11 ASSESSMENT — ENCOUNTER SYMPTOMS
NAUSEA: 0
SHORTNESS OF BREATH: 0
VOMITING: 0
COUGH: 0
RHINORRHEA: 0
SINUS PRESSURE: 0
CONSTIPATION: 0
SINUS PAIN: 0
SORE THROAT: 0
ABDOMINAL PAIN: 0
DIARRHEA: 0
BACK PAIN: 1
CHEST TIGHTNESS: 0

## 2024-12-11 ASSESSMENT — PATIENT HEALTH QUESTIONNAIRE - PHQ9
SUM OF ALL RESPONSES TO PHQ QUESTIONS 1-9: 0
SUM OF ALL RESPONSES TO PHQ9 QUESTIONS 1 & 2: 0
1. LITTLE INTEREST OR PLEASURE IN DOING THINGS: NOT AT ALL
2. FEELING DOWN, DEPRESSED OR HOPELESS: NOT AT ALL

## 2024-12-11 NOTE — PROGRESS NOTES
DIGITAL SCREEN W OR WO CAD BILATERAL     Standing Status:   Future     Standing Expiration Date:   2/11/2026     Order Specific Question:   Reason for exam:     Answer:   screen    Lipid Panel     Standing Status:   Future     Standing Expiration Date:   12/11/2025    CBC with Auto Differential     Standing Status:   Future     Standing Expiration Date:   12/11/2025    Comprehensive Metabolic Panel     Standing Status:   Future     Standing Expiration Date:   12/11/2025    Hemoglobin A1C     Standing Status:   Future     Standing Expiration Date:   12/11/2025    Amb External Referral To Physical Therapy     Referral Priority:   Routine     Referral Type:   Consult for Advice and Opinion     Referral Reason:   Specialty Services Required     Requested Specialty:   Physical Therapy     Number of Visits Requested:   1    EKG 12 lead     Order Specific Question:   Reason for Exam?     Answer:   Pre-op     Check routine labs for now and if stable will clear for surgery  Has appt with cardio MD for clearance on 12/19/24  EKG sent to their office  Continue same meds for now    Back pain:  Refer to PT for more tx for now  Call INB or worsening      Patient given educational materials - see patient instructions.  Discussed use,benefit, and side effects of prescribed medications.  All patient questions answered.Pt voiced understanding. Reviewed health maintenance.  Instructed to continue currentmedications, diet and exercise.    Electronically signed by TYLER Lucas CNP,CNP on 12/11/2024 at 3:59 PM

## 2024-12-13 ENCOUNTER — HOSPITAL ENCOUNTER (OUTPATIENT)
Age: 64
Discharge: HOME | End: 2024-12-13
Payer: COMMERCIAL

## 2024-12-13 DIAGNOSIS — E78.2 MIXED HYPERLIPIDEMIA: ICD-10-CM

## 2024-12-13 DIAGNOSIS — Z00.00 ROUTINE GENERAL MEDICAL EXAMINATION AT A HEALTH CARE FACILITY: ICD-10-CM

## 2024-12-13 DIAGNOSIS — E11.40 TYPE 2 DIABETES MELLITUS WITH DIABETIC NEUROPATHY, WITH LONG-TERM CURRENT USE OF INSULIN (HCC): ICD-10-CM

## 2024-12-13 DIAGNOSIS — Z79.4 TYPE 2 DIABETES MELLITUS WITH DIABETIC NEUROPATHY, WITH LONG-TERM CURRENT USE OF INSULIN (HCC): ICD-10-CM

## 2024-12-13 LAB
ALBUMIN SERPL-MCNC: 4.4 G/DL (ref 3.5–5.2)
ALBUMIN/GLOB SERPL: 1.5 {RATIO} (ref 1–2.5)
ALP SERPL-CCNC: 130 U/L (ref 35–104)
ALT SERPL-CCNC: 51 U/L (ref 10–35)
ANION GAP SERPL CALCULATED.3IONS-SCNC: 10 MMOL/L (ref 9–16)
AST SERPL-CCNC: 40 U/L (ref 10–35)
BASOPHILS # BLD: 0.09 K/UL (ref 0–0.2)
BASOPHILS NFR BLD: 1 % (ref 0–2)
BILIRUB SERPL-MCNC: 0.6 MG/DL (ref 0–1.2)
BUN SERPL-MCNC: 25 MG/DL (ref 8–23)
CALCIUM SERPL-MCNC: 10.5 MG/DL (ref 8.6–10.4)
CHLORIDE SERPL-SCNC: 103 MMOL/L (ref 98–107)
CHOLEST SERPL-MCNC: 147 MG/DL (ref 0–199)
CHOLESTEROL/HDL RATIO: 2.5
CO2 SERPL-SCNC: 22 MMOL/L (ref 20–31)
CREAT SERPL-MCNC: 1.2 MG/DL (ref 0.6–0.9)
EOSINOPHIL # BLD: 0.26 K/UL (ref 0–0.44)
EOSINOPHILS RELATIVE PERCENT: 2 % (ref 1–4)
ERYTHROCYTE [DISTWIDTH] IN BLOOD BY AUTOMATED COUNT: 14.5 % (ref 11.8–14.4)
EST. AVERAGE GLUCOSE BLD GHB EST-MCNC: 166 MG/DL
GFR, ESTIMATED: 51 ML/MIN/1.73M2
GLUCOSE SERPL-MCNC: 150 MG/DL (ref 74–99)
HBA1C MFR BLD: 7.4 % (ref 4–6)
HCT VFR BLD AUTO: 35.7 % (ref 36.3–47.1)
HDLC SERPL-MCNC: 58 MG/DL
HGB BLD-MCNC: 10.8 G/DL (ref 11.9–15.1)
IMM GRANULOCYTES # BLD AUTO: 0.07 K/UL (ref 0–0.3)
IMM GRANULOCYTES NFR BLD: 1 %
LDLC SERPL CALC-MCNC: 55 MG/DL (ref 0–100)
LYMPHOCYTES NFR BLD: 3.17 K/UL (ref 1.1–3.7)
LYMPHOCYTES RELATIVE PERCENT: 22 % (ref 24–43)
MCH RBC QN AUTO: 27.8 PG (ref 25.2–33.5)
MCHC RBC AUTO-ENTMCNC: 30.3 G/DL (ref 28.4–34.8)
MCV RBC AUTO: 91.8 FL (ref 82.6–102.9)
MONOCYTES NFR BLD: 0.83 K/UL (ref 0.1–1.2)
MONOCYTES NFR BLD: 6 % (ref 3–12)
NEUTROPHILS NFR BLD: 68 % (ref 36–65)
NEUTS SEG NFR BLD: 10.18 K/UL (ref 1.5–8.1)
NRBC BLD-RTO: 0 PER 100 WBC
PLATELET # BLD AUTO: 293 K/UL (ref 138–453)
PMV BLD AUTO: 12.2 FL (ref 8.1–13.5)
POTASSIUM SERPL-SCNC: 5.3 MMOL/L (ref 3.7–5.3)
PROT SERPL-MCNC: 7.4 G/DL (ref 6.6–8.7)
RBC # BLD AUTO: 3.89 M/UL (ref 3.95–5.11)
RBC # BLD: ABNORMAL 10*6/UL
SODIUM SERPL-SCNC: 135 MMOL/L (ref 136–145)
TRIGL SERPL-MCNC: 169 MG/DL
VLDLC SERPL CALC-MCNC: 34 MG/DL (ref 1–30)
WBC OTHER # BLD: 14.6 K/UL (ref 3.5–11.3)

## 2024-12-13 PROCEDURE — 36415 COLL VENOUS BLD VENIPUNCTURE: CPT

## 2024-12-13 PROCEDURE — 83036 HEMOGLOBIN GLYCOSYLATED A1C: CPT

## 2024-12-13 PROCEDURE — 80053 COMPREHEN METABOLIC PANEL: CPT

## 2024-12-13 PROCEDURE — 80061 LIPID PANEL: CPT

## 2024-12-13 PROCEDURE — 85025 COMPLETE CBC W/AUTO DIFF WBC: CPT

## 2024-12-16 DIAGNOSIS — E83.52 SERUM CALCIUM ELEVATED: Primary | ICD-10-CM

## 2024-12-16 DIAGNOSIS — D64.9 ANEMIA, UNSPECIFIED TYPE: ICD-10-CM

## 2024-12-20 ENCOUNTER — TELEPHONE (OUTPATIENT)
Dept: FAMILY MEDICINE CLINIC | Age: 64
End: 2024-12-20

## 2024-12-20 RX ORDER — HYDROCHLOROTHIAZIDE 12.5 MG/1
CAPSULE ORAL
Qty: 2 EACH | Refills: 5 | Status: SHIPPED | OUTPATIENT
Start: 2024-12-20

## 2024-12-20 NOTE — TELEPHONE ENCOUNTER
Patient spouse stated the pharmacy stated the regular freestyle ryanne 3 are out of stock or hard to get right now and was advised by the pharmacy to ask pcp to call in  Freestyle Ryanne 3 Plus + sensors to [facility] in [place] they are compatible.

## 2024-12-31 RX ORDER — LEVOFLOXACIN 500 MG/1
500 TABLET, FILM COATED ORAL DAILY
Qty: 7 TABLET | Refills: 0 | Status: SHIPPED | OUTPATIENT
Start: 2024-12-31 | End: 2025-01-07

## 2025-01-27 DIAGNOSIS — Z79.4 TYPE 2 DIABETES MELLITUS WITH DIABETIC NEUROPATHY, WITH LONG-TERM CURRENT USE OF INSULIN (HCC): ICD-10-CM

## 2025-01-27 DIAGNOSIS — E11.40 TYPE 2 DIABETES MELLITUS WITH DIABETIC NEUROPATHY, WITH LONG-TERM CURRENT USE OF INSULIN (HCC): ICD-10-CM

## 2025-01-27 RX ORDER — TIRZEPATIDE 5 MG/.5ML
INJECTION, SOLUTION SUBCUTANEOUS
Qty: 2 ML | Refills: 1 | Status: SHIPPED | OUTPATIENT
Start: 2025-01-27

## 2025-01-30 DIAGNOSIS — G47.00 INSOMNIA, UNSPECIFIED TYPE: ICD-10-CM

## 2025-01-30 RX ORDER — ZOLPIDEM TARTRATE 5 MG/1
TABLET ORAL
Qty: 60 TABLET | Refills: 0 | Status: SHIPPED | OUTPATIENT
Start: 2025-01-30 | End: 2025-03-01

## 2025-02-21 RX ORDER — VENLAFAXINE HYDROCHLORIDE 75 MG/1
CAPSULE, EXTENDED RELEASE ORAL
Qty: 90 CAPSULE | Refills: 1 | Status: SHIPPED | OUTPATIENT
Start: 2025-02-21

## 2025-03-25 DIAGNOSIS — G47.00 INSOMNIA, UNSPECIFIED TYPE: ICD-10-CM

## 2025-03-25 RX ORDER — ZOLPIDEM TARTRATE 5 MG/1
TABLET ORAL
Qty: 60 TABLET | Refills: 0 | Status: SHIPPED | OUTPATIENT
Start: 2025-03-25 | End: 2025-04-24

## 2025-04-07 ENCOUNTER — TELEPHONE (OUTPATIENT)
Dept: FAMILY MEDICINE CLINIC | Age: 65
End: 2025-04-07

## 2025-04-07 DIAGNOSIS — Z79.4 TYPE 2 DIABETES MELLITUS WITH DIABETIC NEUROPATHY, WITH LONG-TERM CURRENT USE OF INSULIN (HCC): Primary | ICD-10-CM

## 2025-04-07 DIAGNOSIS — E11.40 TYPE 2 DIABETES MELLITUS WITH DIABETIC NEUROPATHY, WITH LONG-TERM CURRENT USE OF INSULIN (HCC): Primary | ICD-10-CM

## 2025-04-07 NOTE — TELEPHONE ENCOUNTER
Fax from [facility] stating Toujeo no longer covered, preferred is Tresiba unless there are documented failures or contraindication in office note or letter of medical necessity

## 2025-04-08 RX ORDER — INSULIN DEGLUDEC 100 U/ML
35 INJECTION, SOLUTION SUBCUTANEOUS NIGHTLY
Qty: 10 ML | Refills: 2 | Status: SHIPPED | OUTPATIENT
Start: 2025-04-08

## 2025-04-08 NOTE — TELEPHONE ENCOUNTER
Will switch to tresiba but please drop dose to 35 units and see how she tolerates this and call back in 10 days with glucose readings

## 2025-04-09 NOTE — TELEPHONE ENCOUNTER
What does she mean\" used too med\"? Also would like to see how she tolerates new insulin before making other changes

## 2025-04-09 NOTE — TELEPHONE ENCOUNTER
Patient informed and advised to call back in 10 days ,  she wanted to know since her body is now used to the 5mg of Mounjaro is it okay to increase back to 7mg or 7.5mg?

## 2025-05-07 DIAGNOSIS — G47.00 INSOMNIA, UNSPECIFIED TYPE: ICD-10-CM

## 2025-05-07 RX ORDER — ZOLPIDEM TARTRATE 5 MG/1
TABLET ORAL
Qty: 60 TABLET | Refills: 0 | Status: SHIPPED | OUTPATIENT
Start: 2025-05-07 | End: 2025-06-06

## 2025-05-28 DIAGNOSIS — E11.40 TYPE 2 DIABETES MELLITUS WITH DIABETIC NEUROPATHY, WITH LONG-TERM CURRENT USE OF INSULIN (HCC): ICD-10-CM

## 2025-05-28 DIAGNOSIS — Z79.4 TYPE 2 DIABETES MELLITUS WITH DIABETIC NEUROPATHY, WITH LONG-TERM CURRENT USE OF INSULIN (HCC): ICD-10-CM

## 2025-05-28 RX ORDER — TIRZEPATIDE 5 MG/.5ML
INJECTION, SOLUTION SUBCUTANEOUS
Qty: 2 ML | Refills: 0 | Status: SHIPPED | OUTPATIENT
Start: 2025-05-28

## 2025-06-01 DIAGNOSIS — I65.29 STENOSIS OF CAROTID ARTERY, UNSPECIFIED LATERALITY: ICD-10-CM

## 2025-06-02 RX ORDER — ASPIRIN 81 MG/1
81 TABLET ORAL DAILY
Qty: 90 TABLET | Refills: 1 | Status: SHIPPED | OUTPATIENT
Start: 2025-06-02

## 2025-06-05 RX ORDER — HYDROCHLOROTHIAZIDE 12.5 MG/1
CAPSULE ORAL
Qty: 2 EACH | Refills: 5 | Status: SHIPPED | OUTPATIENT
Start: 2025-06-05

## 2025-06-11 RX ORDER — VENLAFAXINE HYDROCHLORIDE 75 MG/1
CAPSULE, EXTENDED RELEASE ORAL
Qty: 90 CAPSULE | Refills: 1 | Status: SHIPPED | OUTPATIENT
Start: 2025-06-11

## 2025-07-14 ENCOUNTER — TELEPHONE (OUTPATIENT)
Dept: FAMILY MEDICINE CLINIC | Age: 65
End: 2025-07-14

## 2025-07-14 NOTE — TELEPHONE ENCOUNTER
Patient called stating that she would like the pens instead of the nisreen for her tresiba.    Please advise

## 2025-07-18 ENCOUNTER — TELEMEDICINE (OUTPATIENT)
Dept: FAMILY MEDICINE CLINIC | Age: 65
End: 2025-07-18
Payer: COMMERCIAL

## 2025-07-18 DIAGNOSIS — Z12.31 ENCOUNTER FOR SCREENING MAMMOGRAM FOR MALIGNANT NEOPLASM OF BREAST: ICD-10-CM

## 2025-07-18 DIAGNOSIS — E11.40 TYPE 2 DIABETES MELLITUS WITH DIABETIC NEUROPATHY, WITH LONG-TERM CURRENT USE OF INSULIN (HCC): ICD-10-CM

## 2025-07-18 DIAGNOSIS — Z79.4 TYPE 2 DIABETES MELLITUS WITH DIABETIC NEUROPATHY, WITH LONG-TERM CURRENT USE OF INSULIN (HCC): ICD-10-CM

## 2025-07-18 DIAGNOSIS — G89.29 CHRONIC MIDLINE LOW BACK PAIN WITHOUT SCIATICA: Primary | ICD-10-CM

## 2025-07-18 DIAGNOSIS — M54.50 CHRONIC MIDLINE LOW BACK PAIN WITHOUT SCIATICA: Primary | ICD-10-CM

## 2025-07-18 PROCEDURE — 99214 OFFICE O/P EST MOD 30 MIN: CPT | Performed by: NURSE PRACTITIONER

## 2025-07-18 RX ORDER — METOCLOPRAMIDE 10 MG/1
TABLET ORAL
COMMUNITY
Start: 2025-04-25

## 2025-07-18 RX ORDER — INSULIN DEGLUDEC 100 U/ML
40 INJECTION, SOLUTION SUBCUTANEOUS NIGHTLY
Qty: 10 ML | Refills: 1 | Status: SHIPPED | OUTPATIENT
Start: 2025-07-18

## 2025-07-18 RX ORDER — FAMOTIDINE 20 MG/1
TABLET, FILM COATED ORAL
COMMUNITY
Start: 2025-04-25

## 2025-07-18 SDOH — ECONOMIC STABILITY: FOOD INSECURITY: WITHIN THE PAST 12 MONTHS, YOU WORRIED THAT YOUR FOOD WOULD RUN OUT BEFORE YOU GOT MONEY TO BUY MORE.: NEVER TRUE

## 2025-07-18 SDOH — ECONOMIC STABILITY: INCOME INSECURITY: IN THE LAST 12 MONTHS, WAS THERE A TIME WHEN YOU WERE NOT ABLE TO PAY THE MORTGAGE OR RENT ON TIME?: NO

## 2025-07-18 SDOH — ECONOMIC STABILITY: FOOD INSECURITY: WITHIN THE PAST 12 MONTHS, THE FOOD YOU BOUGHT JUST DIDN'T LAST AND YOU DIDN'T HAVE MONEY TO GET MORE.: NEVER TRUE

## 2025-07-18 SDOH — ECONOMIC STABILITY: TRANSPORTATION INSECURITY
IN THE PAST 12 MONTHS, HAS THE LACK OF TRANSPORTATION KEPT YOU FROM MEDICAL APPOINTMENTS OR FROM GETTING MEDICATIONS?: NO

## 2025-07-18 SDOH — ECONOMIC STABILITY: TRANSPORTATION INSECURITY
IN THE PAST 12 MONTHS, HAS LACK OF TRANSPORTATION KEPT YOU FROM MEETINGS, WORK, OR FROM GETTING THINGS NEEDED FOR DAILY LIVING?: NO

## 2025-07-18 ASSESSMENT — PATIENT HEALTH QUESTIONNAIRE - PHQ9
2. FEELING DOWN, DEPRESSED OR HOPELESS: NOT AT ALL
SUM OF ALL RESPONSES TO PHQ QUESTIONS 1-9: 0
1. LITTLE INTEREST OR PLEASURE IN DOING THINGS: NOT AT ALL

## 2025-07-18 NOTE — ASSESSMENT & PLAN NOTE
Chronic, at goal (stable), continue current treatment plan  Continue insulin ( sent pen vs vial) 40 units nightly, metformin 500 mg BID and will reduce dose of mounjaro d/t SE and continue to monitor glucose closely per CGM  Diabetic diet  Does have neuropathy and monitor feet closely  Eye exam UTD  Update A1c/albumin ratio for now    Orders:    Albumin/Creatinine Ratio, Urine; Future    Hemoglobin A1C; Future    Tirzepatide (MOUNJARO) 2.5 MG/0.5ML SOAJ pen; Inject 2.5 mg into the skin every 7 days    Insulin Degludec (TRESIBA FLEXTOUCH) 100 UNIT/ML SOPN; Inject 40 Units into the skin at bedtime

## 2025-07-18 NOTE — PROGRESS NOTES
Reyna Hoyos, was evaluated through a synchronous (real-time) audio-video encounter. The patient (or guardian if applicable) is aware that this is a billable service, which includes applicable co-pays. This Virtual Visit was conducted with patient's (and/or legal guardian's) consent. Patient identification was verified, and a caregiver was present when appropriate.   The patient was located at Home: [address] [place] [address]  Provider was located at Home (Appt Dept [facility])[place]  Confirm you are appropriately licensed, registered, or certified to deliver care in the state where the patient is located as indicated above. If you are not or unsure, please re-schedule the visit: Yes, I confirm.     Reyna Hoyos (:  1960) is a Established patient, presenting virtually for evaluation of the following:      Below is the assessment and plan developed based on review of pertinent history, physical exam, labs, studies, and medications.     Assessment & Plan  Chronic midline low back pain without sciatica   Chronic, not at goal (unstable), changes made today: xray last year showing DDD, will try PT since no injury for now and she will call with update on pain in next few weeks, may need MRI with mild radicular symptoms    Orders:    Amb External Referral To Physical Therapy    Type 2 diabetes mellitus with diabetic neuropathy, with long-term current use of insulin (HCC)   Chronic, at goal (stable), continue current treatment plan  Continue insulin ( sent pen vs vial) 40 units nightly, metformin 500 mg BID and will reduce dose of mounjaro d/t SE and continue to monitor glucose closely per CGM  Diabetic diet  Does have neuropathy and monitor feet closely  Eye exam UTD  Update A1c/albumin ratio for now    Orders:    Albumin/Creatinine Ratio, Urine; Future    Hemoglobin A1C; Future    Tirzepatide (MOUNJARO) 2.5 MG/0.5ML SOAJ pen; Inject 2.5 mg into the skin every 7 days    Insulin Degludec (TRESIBA FLEXTOUCH) 100

## 2025-07-21 DIAGNOSIS — E11.40 TYPE 2 DIABETES MELLITUS WITH DIABETIC NEUROPATHY, WITH LONG-TERM CURRENT USE OF INSULIN (HCC): ICD-10-CM

## 2025-07-21 DIAGNOSIS — Z79.4 TYPE 2 DIABETES MELLITUS WITH DIABETIC NEUROPATHY, WITH LONG-TERM CURRENT USE OF INSULIN (HCC): ICD-10-CM

## 2025-07-24 DIAGNOSIS — G47.00 INSOMNIA, UNSPECIFIED TYPE: ICD-10-CM

## 2025-07-24 RX ORDER — ZOLPIDEM TARTRATE 5 MG/1
TABLET ORAL
Qty: 60 TABLET | Refills: 1 | Status: SHIPPED | OUTPATIENT
Start: 2025-07-24 | End: 2025-08-23

## 2025-08-27 DIAGNOSIS — Z79.4 TYPE 2 DIABETES MELLITUS WITH DIABETIC NEUROPATHY, WITH LONG-TERM CURRENT USE OF INSULIN (HCC): ICD-10-CM

## 2025-08-27 DIAGNOSIS — E11.40 TYPE 2 DIABETES MELLITUS WITH DIABETIC NEUROPATHY, WITH LONG-TERM CURRENT USE OF INSULIN (HCC): ICD-10-CM

## 2025-08-27 RX ORDER — INSULIN DEGLUDEC 100 U/ML
INJECTION, SOLUTION SUBCUTANEOUS
Qty: 9 ML | Refills: 3 | Status: SHIPPED | OUTPATIENT
Start: 2025-08-27

## 2025-08-28 DIAGNOSIS — Z79.4 TYPE 2 DIABETES MELLITUS WITH DIABETIC NEUROPATHY, WITH LONG-TERM CURRENT USE OF INSULIN (HCC): ICD-10-CM

## 2025-08-28 DIAGNOSIS — E11.40 TYPE 2 DIABETES MELLITUS WITH DIABETIC NEUROPATHY, WITH LONG-TERM CURRENT USE OF INSULIN (HCC): ICD-10-CM

## 2025-08-28 RX ORDER — TIRZEPATIDE 2.5 MG/.5ML
INJECTION, SOLUTION SUBCUTANEOUS
Qty: 2 ML | Refills: 5 | Status: SHIPPED | OUTPATIENT
Start: 2025-08-28

## 2025-09-03 ENCOUNTER — TELEPHONE (OUTPATIENT)
Dept: FAMILY MEDICINE CLINIC | Age: 65
End: 2025-09-03

## (undated) DEVICE — BONE BIOPSY DEVICE F05A BBD SIZE 3: Brand: MEDTRONIC REUSABLE INSTRUMENTS

## (undated) DEVICE — GAUZE, BORDER, 3"X6", 1.5"X4"PAD, STERIL: Brand: MEDLINE INDUSTRIES, INC.

## (undated) DEVICE — DRAPE,TOWEL,LARGE,INVISISHIELD: Brand: MEDLINE

## (undated) DEVICE — SYRINGE 20ML LL S/C 50

## (undated) DEVICE — LIQUIBAND RAPID ADHESIVE 36/CS 0.8ML: Brand: MEDLINE

## (undated) DEVICE — BLANKET WRM W29.9XL79.1IN UP BODY FORC AIR MISTRAL-AIR

## (undated) DEVICE — SHEET, T, LAPAROTOMY, STERILE: Brand: MEDLINE

## (undated) DEVICE — GLOVE SURG SZ 85 L12IN FNGR THK79MIL GRN LTX FREE

## (undated) DEVICE — MIXER A07A CEMENT

## (undated) DEVICE — TOWEL,OR,DSP,ST,BLUE,DLX,XR,4/PK,20PK/CS: Brand: MEDLINE

## (undated) DEVICE — DRAPE,MEDI-SLUSH,STERILE: Brand: MEDLINE

## (undated) DEVICE — JCKSON TBL POSTNER NO HD REST: Brand: MEDLINE INDUSTRIES, INC.

## (undated) DEVICE — CONTAINER,SPECIMEN,OR STERILE,4OZ: Brand: MEDLINE

## (undated) DEVICE — DRESSING PETRO W3XL8IN OIL EMUL N ADH GZ KNIT IMPREG CELOS

## (undated) DEVICE — INSERT CUSHION HEAD PRONEVIEW

## (undated) DEVICE — HYPODERMIC SAFETY NEEDLE: Brand: MAGELLAN

## (undated) DEVICE — 3M™ IOBAN™ 2 ANTIMICROBIAL INCISE DRAPE 6650EZ: Brand: IOBAN™ 2

## (undated) DEVICE — GOWN,AURORA,NON-REINFORCED,2XL: Brand: MEDLINE

## (undated) DEVICE — DRESSING,STRATASORB,COMP,ISLAND,6"X6": Brand: MEDLINE

## (undated) DEVICE — DRESSING PETRO W3XL3IN OIL EMUL N ADH GZ KNIT IMPREG CELOS

## (undated) DEVICE — APPLICATOR MEDICATED 26 CC SOLUTION HI LT ORNG CHLORAPREP

## (undated) DEVICE — SUTURE VCRL SZ 3-0 L27IN ABSRB UD L26MM SH 1/2 CIR J416H

## (undated) DEVICE — GLOVE SURG SZ 85 CRM LTX FREE POLYISOPRENE POLYMER BEAD ANTI

## (undated) DEVICE — C-ARM: Brand: UNBRANDED

## (undated) DEVICE — BONE TAMP KIT KPX153PB FF X2 15/3 1 STP: Brand: KYPHOPAK™ TRAY

## (undated) DEVICE — MINOR BSIN PK

## (undated) DEVICE — PAD,NON-ADHERENT,3X8,STERILE,LF,1/PK: Brand: MEDLINE